# Patient Record
Sex: FEMALE
[De-identification: names, ages, dates, MRNs, and addresses within clinical notes are randomized per-mention and may not be internally consistent; named-entity substitution may affect disease eponyms.]

---

## 2020-09-28 ENCOUNTER — APPOINTMENT (OUTPATIENT)
Dept: UROLOGY | Facility: CLINIC | Age: 80
End: 2020-09-28
Payer: MEDICARE

## 2020-09-28 VITALS — HEART RATE: 77 BPM | SYSTOLIC BLOOD PRESSURE: 131 MMHG | DIASTOLIC BLOOD PRESSURE: 84 MMHG

## 2020-09-28 VITALS — TEMPERATURE: 96.9 F

## 2020-09-28 PROCEDURE — 99204 OFFICE O/P NEW MOD 45 MIN: CPT | Mod: 25

## 2020-09-28 PROCEDURE — 99214 OFFICE O/P EST MOD 30 MIN: CPT | Mod: 25

## 2020-09-28 PROCEDURE — 51798 US URINE CAPACITY MEASURE: CPT

## 2020-09-28 PROCEDURE — 99203 OFFICE O/P NEW LOW 30 MIN: CPT | Mod: 25

## 2020-09-28 NOTE — HISTORY OF PRESENT ILLNESS
[FreeTextEntry1] : Pt is an 80 year old female  who presents for complaints of "dark" vaginal discharge that began x 1 week ago. Pt states that she wears pads for urinary leakage throughout the day and last week she began to notice dark brown spotting in the pad which stopped last Friday.  She denies gross hematuria. Pt also reports nocturia and urgency incontinence that began 2-3 months ago.She wears 3 pads per day and a heavy pad at night. Pt has had UTIs in the past however she reports that  her symptoms now are not similar to that time. She states she has been experiencing left lower back pain and she recently underwent treatment with her spinal doctor and her symptoms improved after a local injection. She denies chills, fever, and gross hematuria, \par \par Not sexually active\par \par PMH: None\par SH: Colonoscopy 2019 normal\par Family Hx: Kidney stones, breast cancer mother \par Social hx: Former smoker, quit  (at peak: 1 pack per week), daily EtOH use

## 2020-09-28 NOTE — ASSESSMENT
[FreeTextEntry1] : Pt is an 80 year old female who presents for complaints of "dark" discharge (vagina vs urethra) that began x 1 week ago. Will send urine for culture and cytology today and patient will return for cystoscopy.   Arrangements made for patient to undergo a CT Urogram.

## 2020-09-28 NOTE — PHYSICAL EXAM
[General Appearance - Well Developed] : well developed [General Appearance - Well Nourished] : well nourished [Normal Appearance] : normal appearance [Well Groomed] : well groomed [General Appearance - In No Acute Distress] : no acute distress [] : no respiratory distress [Exaggerated Use Of Accessory Muscles For Inspiration] : no accessory muscle use [Costovertebral Angle Tenderness] : no ~M costovertebral angle tenderness [Normal Station and Gait] : the gait and station were normal for the patient's age [No Focal Deficits] : no focal deficits [Oriented To Time, Place, And Person] : oriented to person, place, and time [Affect] : the affect was normal [Mood] : the mood was normal [Not Anxious] : not anxious [Urinary Bladder Findings] : the bladder was normal on palpation [Vagina] : normal vaginal exam [Uterus] : uterus was normal size, without masses or tenderness [FreeTextEntry1] : no blood noted in vaginal vault, grade 1 cystocele

## 2020-09-30 LAB
BACTERIA UR CULT: NORMAL
URINE CYTOLOGY: NORMAL

## 2020-10-12 ENCOUNTER — APPOINTMENT (OUTPATIENT)
Dept: UROLOGY | Facility: CLINIC | Age: 80
End: 2020-10-12
Payer: MEDICARE

## 2020-10-12 ENCOUNTER — TRANSCRIPTION ENCOUNTER (OUTPATIENT)
Age: 80
End: 2020-10-12

## 2020-10-12 VITALS
SYSTOLIC BLOOD PRESSURE: 130 MMHG | BODY MASS INDEX: 38.4 KG/M2 | OXYGEN SATURATION: 95 % | HEIGHT: 62.5 IN | DIASTOLIC BLOOD PRESSURE: 78 MMHG | HEART RATE: 71 BPM | TEMPERATURE: 98.1 F | WEIGHT: 214 LBS

## 2020-10-12 DIAGNOSIS — Z00.00 ENCOUNTER FOR GENERAL ADULT MEDICAL EXAMINATION W/OUT ABNORMAL FINDINGS: ICD-10-CM

## 2020-10-12 LAB
BILIRUB UR QL STRIP: NORMAL
CLARITY UR: CLEAR
COLLECTION METHOD: NORMAL
GLUCOSE UR-MCNC: NORMAL
HCG UR QL: 1 EU/DL
HGB UR QL STRIP.AUTO: NORMAL
KETONES UR-MCNC: NORMAL
LEUKOCYTE ESTERASE UR QL STRIP: NORMAL
NITRITE UR QL STRIP: NORMAL
PH UR STRIP: 6
PROT UR STRIP-MCNC: NORMAL
SP GR UR STRIP: >=1.03

## 2020-10-12 PROCEDURE — 81003 URINALYSIS AUTO W/O SCOPE: CPT | Mod: QW

## 2020-10-12 PROCEDURE — 52000 CYSTOURETHROSCOPY: CPT

## 2020-10-12 PROCEDURE — 99214 OFFICE O/P EST MOD 30 MIN: CPT | Mod: 25

## 2020-10-13 NOTE — LETTER BODY
[Dear  ___] : Dear  [unfilled], [Consult Letter:] : I had the pleasure of evaluating your patient, [unfilled]. [Please see my note below.] : Please see my note below. [Consult Closing:] : Thank you very much for allowing me to participate in the care of this patient.  If you have any questions, please do not hesitate to contact me. [Sincerely,] : Sincerely, [FreeTextEntry3] : Dr. Erica Kan\par

## 2020-10-13 NOTE — ASSESSMENT
[FreeTextEntry1] : Pt is an 80 year old female who presents cystoscopy evaluation for complaints of "dark" discharge (vagina vs urethra) that began x 2 week ago. Cystoscopy today unremarkable. We reviewed the results of her recent CT scan which was negative from a genitourinary perspective but did reveal a right cystic adnexal mass.  She will follow up with her gynecologist regarding this.  We provided Ms. Chloe with a copy of the CT and faxed it to Dr. Tom Soto, her gynecologist.

## 2020-10-13 NOTE — HISTORY OF PRESENT ILLNESS
[FreeTextEntry1] : Pt is an 80 year old female  who initially presented for complaints of "dark" vaginal discharge that began x 2 week ago. Pt states that she wears pads for urinary leakage throughout the day and last week she began to notice dark brown spotting in the pad which stopped last Friday.  She denies gross hematuria. Pt also reports nocturia and urgency incontinence that began 2-3 months ago.She wears 3 pads per day and a heavy pad at night. Pt has had UTIs in the past however she reports that  her symptoms now are not similar to that time. She states she has been experiencing left lower back pain and she recently underwent treatment with her spinal doctor and her symptoms improved after a local injection. She denies chills, fever, and gross hematuria, She presents today for cystoscopy. \par \par \par 2020 Urine culture and cytology negative\par \par Not sexually active\par \par PMH: None\par SH: Colonoscopy  normal\par Family Hx: Kidney stones, breast cancer mother \par Social hx: Former smoker, quit  (at peak: 1 pack per week), daily EtOH use

## 2020-10-13 NOTE — PHYSICAL EXAM
[General Appearance - Well Developed] : well developed [General Appearance - Well Nourished] : well nourished [Normal Appearance] : normal appearance [Well Groomed] : well groomed [General Appearance - In No Acute Distress] : no acute distress [Costovertebral Angle Tenderness] : no ~M costovertebral angle tenderness [Urinary Bladder Findings] : the bladder was normal on palpation [Vagina] : normal vaginal exam [Uterus] : uterus was normal size, without masses or tenderness [] : no respiratory distress [Exaggerated Use Of Accessory Muscles For Inspiration] : no accessory muscle use [Oriented To Time, Place, And Person] : oriented to person, place, and time [Affect] : the affect was normal [Mood] : the mood was normal [Not Anxious] : not anxious [Normal Station and Gait] : the gait and station were normal for the patient's age [No Focal Deficits] : no focal deficits [FreeTextEntry1] : no blood noted in vaginal vault, grade 1 cystocele

## 2020-10-14 LAB — BACTERIA UR CULT: NORMAL

## 2024-11-29 ENCOUNTER — OUTPATIENT (OUTPATIENT)
Dept: OUTPATIENT SERVICES | Facility: HOSPITAL | Age: 84
LOS: 1 days | End: 2024-11-29
Payer: MEDICARE

## 2024-11-29 ENCOUNTER — APPOINTMENT (OUTPATIENT)
Dept: CT IMAGING | Facility: HOSPITAL | Age: 84
End: 2024-11-29

## 2024-11-29 PROCEDURE — 75574 CT ANGIO HRT W/3D IMAGE: CPT | Mod: MH

## 2024-11-29 PROCEDURE — 75574 CT ANGIO HRT W/3D IMAGE: CPT | Mod: 26,MH

## 2024-11-29 PROCEDURE — 82565 ASSAY OF CREATININE: CPT

## 2025-02-13 ENCOUNTER — INPATIENT (INPATIENT)
Facility: HOSPITAL | Age: 85
LOS: 2 days | Discharge: NURSING, REHAB OR LONG-TERM CARE | DRG: 948 | End: 2025-02-16
Attending: STUDENT IN AN ORGANIZED HEALTH CARE EDUCATION/TRAINING PROGRAM | Admitting: STUDENT IN AN ORGANIZED HEALTH CARE EDUCATION/TRAINING PROGRAM
Payer: MEDICARE

## 2025-02-13 VITALS
TEMPERATURE: 98 F | WEIGHT: 201.94 LBS | DIASTOLIC BLOOD PRESSURE: 76 MMHG | OXYGEN SATURATION: 97 % | SYSTOLIC BLOOD PRESSURE: 179 MMHG | HEART RATE: 85 BPM | RESPIRATION RATE: 18 BRPM | HEIGHT: 62 IN

## 2025-02-13 DIAGNOSIS — N94.89 OTHER SPECIFIED CONDITIONS ASSOCIATED WITH FEMALE GENITAL ORGANS AND MENSTRUAL CYCLE: ICD-10-CM

## 2025-02-13 DIAGNOSIS — S32.040A WEDGE COMPRESSION FRACTURE OF FOURTH LUMBAR VERTEBRA, INITIAL ENCOUNTER FOR CLOSED FRACTURE: ICD-10-CM

## 2025-02-13 DIAGNOSIS — Z29.9 ENCOUNTER FOR PROPHYLACTIC MEASURES, UNSPECIFIED: ICD-10-CM

## 2025-02-13 LAB
ALBUMIN SERPL ELPH-MCNC: 4.1 G/DL — SIGNIFICANT CHANGE UP (ref 3.3–5)
ALP SERPL-CCNC: 96 U/L — SIGNIFICANT CHANGE UP (ref 40–120)
ALT FLD-CCNC: 11 U/L — SIGNIFICANT CHANGE UP (ref 10–45)
ANION GAP SERPL CALC-SCNC: 13 MMOL/L — SIGNIFICANT CHANGE UP (ref 5–17)
AST SERPL-CCNC: 25 U/L — SIGNIFICANT CHANGE UP (ref 10–40)
BASOPHILS # BLD AUTO: 0.06 K/UL — SIGNIFICANT CHANGE UP (ref 0–0.2)
BASOPHILS NFR BLD AUTO: 0.5 % — SIGNIFICANT CHANGE UP (ref 0–2)
BILIRUB SERPL-MCNC: 0.8 MG/DL — SIGNIFICANT CHANGE UP (ref 0.2–1.2)
BUN SERPL-MCNC: 16 MG/DL — SIGNIFICANT CHANGE UP (ref 7–23)
CALCIUM SERPL-MCNC: 9.2 MG/DL — SIGNIFICANT CHANGE UP (ref 8.4–10.5)
CHLORIDE SERPL-SCNC: 101 MMOL/L — SIGNIFICANT CHANGE UP (ref 96–108)
CO2 SERPL-SCNC: 23 MMOL/L — SIGNIFICANT CHANGE UP (ref 22–31)
CREAT SERPL-MCNC: 0.52 MG/DL — SIGNIFICANT CHANGE UP (ref 0.5–1.3)
EGFR: 92 ML/MIN/1.73M2 — SIGNIFICANT CHANGE UP
EOSINOPHIL # BLD AUTO: 0.08 K/UL — SIGNIFICANT CHANGE UP (ref 0–0.5)
EOSINOPHIL NFR BLD AUTO: 0.6 % — SIGNIFICANT CHANGE UP (ref 0–6)
FLUAV AG NPH QL: SIGNIFICANT CHANGE UP
FLUBV AG NPH QL: SIGNIFICANT CHANGE UP
GLUCOSE SERPL-MCNC: 99 MG/DL — SIGNIFICANT CHANGE UP (ref 70–99)
HCT VFR BLD CALC: 46.1 % — HIGH (ref 34.5–45)
HGB BLD-MCNC: 15.3 G/DL — SIGNIFICANT CHANGE UP (ref 11.5–15.5)
IMM GRANULOCYTES NFR BLD AUTO: 0.4 % — SIGNIFICANT CHANGE UP (ref 0–0.9)
LYMPHOCYTES # BLD AUTO: 15.6 % — SIGNIFICANT CHANGE UP (ref 13–44)
LYMPHOCYTES # BLD AUTO: 2.04 K/UL — SIGNIFICANT CHANGE UP (ref 1–3.3)
MCHC RBC-ENTMCNC: 29.2 PG — SIGNIFICANT CHANGE UP (ref 27–34)
MCHC RBC-ENTMCNC: 33.2 G/DL — SIGNIFICANT CHANGE UP (ref 32–36)
MCV RBC AUTO: 88 FL — SIGNIFICANT CHANGE UP (ref 80–100)
MONOCYTES # BLD AUTO: 1.43 K/UL — HIGH (ref 0–0.9)
MONOCYTES NFR BLD AUTO: 10.9 % — SIGNIFICANT CHANGE UP (ref 2–14)
NEUTROPHILS # BLD AUTO: 9.44 K/UL — HIGH (ref 1.8–7.4)
NEUTROPHILS NFR BLD AUTO: 72 % — SIGNIFICANT CHANGE UP (ref 43–77)
NRBC BLD AUTO-RTO: 0 /100 WBCS — SIGNIFICANT CHANGE UP (ref 0–0)
PLATELET # BLD AUTO: 233 K/UL — SIGNIFICANT CHANGE UP (ref 150–400)
POTASSIUM SERPL-MCNC: 3.9 MMOL/L — SIGNIFICANT CHANGE UP (ref 3.5–5.3)
POTASSIUM SERPL-SCNC: 3.9 MMOL/L — SIGNIFICANT CHANGE UP (ref 3.5–5.3)
PROT SERPL-MCNC: 6.7 G/DL — SIGNIFICANT CHANGE UP (ref 6–8.3)
RBC # BLD: 5.24 M/UL — HIGH (ref 3.8–5.2)
RBC # FLD: 13.9 % — SIGNIFICANT CHANGE UP (ref 10.3–14.5)
RSV RNA NPH QL NAA+NON-PROBE: SIGNIFICANT CHANGE UP
SARS-COV-2 RNA SPEC QL NAA+PROBE: SIGNIFICANT CHANGE UP
SODIUM SERPL-SCNC: 137 MMOL/L — SIGNIFICANT CHANGE UP (ref 135–145)
WBC # BLD: 13.1 K/UL — HIGH (ref 3.8–10.5)
WBC # FLD AUTO: 13.1 K/UL — HIGH (ref 3.8–10.5)

## 2025-02-13 PROCEDURE — 93010 ELECTROCARDIOGRAM REPORT: CPT

## 2025-02-13 PROCEDURE — 74177 CT ABD & PELVIS W/CONTRAST: CPT | Mod: 26

## 2025-02-13 PROCEDURE — 99223 1ST HOSP IP/OBS HIGH 75: CPT | Mod: GC

## 2025-02-13 PROCEDURE — 71045 X-RAY EXAM CHEST 1 VIEW: CPT | Mod: 26

## 2025-02-13 PROCEDURE — 99285 EMERGENCY DEPT VISIT HI MDM: CPT

## 2025-02-13 RX ORDER — ACETAMINOPHEN 500 MG/5ML
650 LIQUID (ML) ORAL EVERY 6 HOURS
Refills: 0 | Status: DISCONTINUED | OUTPATIENT
Start: 2025-02-13 | End: 2025-02-16

## 2025-02-13 RX ORDER — ACETAMINOPHEN 500 MG/5ML
650 LIQUID (ML) ORAL EVERY 6 HOURS
Refills: 0 | Status: DISCONTINUED | OUTPATIENT
Start: 2025-02-13 | End: 2025-02-13

## 2025-02-13 RX ORDER — DULOXETINE 20 MG/1
30 CAPSULE, DELAYED RELEASE ORAL DAILY
Refills: 0 | Status: DISCONTINUED | OUTPATIENT
Start: 2025-02-13 | End: 2025-02-16

## 2025-02-13 RX ORDER — SENNA 187 MG
2 TABLET ORAL AT BEDTIME
Refills: 0 | Status: DISCONTINUED | OUTPATIENT
Start: 2025-02-13 | End: 2025-02-16

## 2025-02-13 RX ORDER — DULOXETINE 20 MG/1
1 CAPSULE, DELAYED RELEASE ORAL
Refills: 0 | DISCHARGE

## 2025-02-13 RX ORDER — METOPROLOL SUCCINATE 50 MG/1
1 TABLET, EXTENDED RELEASE ORAL
Refills: 0 | DISCHARGE

## 2025-02-13 RX ORDER — ROSUVASTATIN CALCIUM 20 MG/1
1 TABLET, FILM COATED ORAL
Refills: 0 | DISCHARGE

## 2025-02-13 RX ORDER — KETOROLAC TROMETHAMINE 30 MG/ML
30 INJECTION, SOLUTION INTRAMUSCULAR; INTRAVENOUS ONCE
Refills: 0 | Status: DISCONTINUED | OUTPATIENT
Start: 2025-02-13 | End: 2025-02-13

## 2025-02-13 RX ORDER — ENOXAPARIN SODIUM 100 MG/ML
40 INJECTION SUBCUTANEOUS EVERY 24 HOURS
Refills: 0 | Status: DISCONTINUED | OUTPATIENT
Start: 2025-02-13 | End: 2025-02-13

## 2025-02-13 RX ORDER — CYCLOBENZAPRINE HYDROCHLORIDE 15 MG/1
1 CAPSULE, EXTENDED RELEASE ORAL
Refills: 0 | DISCHARGE

## 2025-02-13 RX ORDER — IBUPROFEN 200 MG
400 TABLET ORAL EVERY 8 HOURS
Refills: 0 | Status: DISCONTINUED | OUTPATIENT
Start: 2025-02-13 | End: 2025-02-16

## 2025-02-13 RX ORDER — IBUPROFEN 200 MG
200 TABLET ORAL EVERY 8 HOURS
Refills: 0 | Status: DISCONTINUED | OUTPATIENT
Start: 2025-02-13 | End: 2025-02-13

## 2025-02-13 RX ORDER — ASPIRIN 325 MG
1 TABLET ORAL
Refills: 0 | DISCHARGE

## 2025-02-13 RX ORDER — METOPROLOL SUCCINATE 50 MG/1
25 TABLET, EXTENDED RELEASE ORAL EVERY 24 HOURS
Refills: 0 | Status: DISCONTINUED | OUTPATIENT
Start: 2025-02-14 | End: 2025-02-16

## 2025-02-13 RX ORDER — ROSUVASTATIN CALCIUM 20 MG/1
20 TABLET, FILM COATED ORAL AT BEDTIME
Refills: 0 | Status: DISCONTINUED | OUTPATIENT
Start: 2025-02-13 | End: 2025-02-16

## 2025-02-13 RX ORDER — GABAPENTIN 400 MG/1
1 CAPSULE ORAL
Refills: 0 | DISCHARGE

## 2025-02-13 RX ORDER — GABAPENTIN 400 MG/1
100 CAPSULE ORAL EVERY 8 HOURS
Refills: 0 | Status: DISCONTINUED | OUTPATIENT
Start: 2025-02-13 | End: 2025-02-16

## 2025-02-13 RX ORDER — KETOROLAC TROMETHAMINE 30 MG/ML
15 INJECTION, SOLUTION INTRAMUSCULAR; INTRAVENOUS EVERY 8 HOURS
Refills: 0 | Status: DISCONTINUED | OUTPATIENT
Start: 2025-02-13 | End: 2025-02-14

## 2025-02-13 RX ORDER — LIDOCAINE HYDROCHLORIDE 20 MG/ML
1 JELLY TOPICAL EVERY 24 HOURS
Refills: 0 | Status: DISCONTINUED | OUTPATIENT
Start: 2025-02-13 | End: 2025-02-16

## 2025-02-13 RX ORDER — METHYLPREDNISOLONE ACETATE 80 MG/ML
INJECTION, SUSPENSION INTRA-ARTICULAR; INTRALESIONAL; INTRAMUSCULAR; SOFT TISSUE
Refills: 0 | Status: DISCONTINUED | OUTPATIENT
Start: 2025-02-14 | End: 2025-02-16

## 2025-02-13 RX ORDER — OMEPRAZOLE 20 MG/1
1 CAPSULE, DELAYED RELEASE ORAL
Refills: 0 | DISCHARGE

## 2025-02-13 RX ORDER — CYCLOBENZAPRINE HYDROCHLORIDE 15 MG/1
10 CAPSULE, EXTENDED RELEASE ORAL ONCE
Refills: 0 | Status: COMPLETED | OUTPATIENT
Start: 2025-02-13 | End: 2025-02-13

## 2025-02-13 RX ORDER — CYCLOBENZAPRINE HYDROCHLORIDE 15 MG/1
5 CAPSULE, EXTENDED RELEASE ORAL EVERY 8 HOURS
Refills: 0 | Status: DISCONTINUED | OUTPATIENT
Start: 2025-02-13 | End: 2025-02-16

## 2025-02-13 RX ORDER — MELATONIN 5 MG
3 TABLET ORAL AT BEDTIME
Refills: 0 | Status: DISCONTINUED | OUTPATIENT
Start: 2025-02-13 | End: 2025-02-16

## 2025-02-13 RX ORDER — POLYETHYLENE GLYCOL 3350 17 G/17G
17 POWDER, FOR SOLUTION ORAL EVERY 24 HOURS
Refills: 0 | Status: DISCONTINUED | OUTPATIENT
Start: 2025-02-14 | End: 2025-02-16

## 2025-02-13 RX ORDER — ENOXAPARIN SODIUM 100 MG/ML
40 INJECTION SUBCUTANEOUS EVERY 12 HOURS
Refills: 0 | Status: DISCONTINUED | OUTPATIENT
Start: 2025-02-13 | End: 2025-02-16

## 2025-02-13 RX ADMIN — KETOROLAC TROMETHAMINE 30 MILLIGRAM(S): 30 INJECTION, SOLUTION INTRAMUSCULAR; INTRAVENOUS at 20:48

## 2025-02-13 RX ADMIN — Medication 1000 MILLILITER(S): at 18:11

## 2025-02-13 RX ADMIN — ENOXAPARIN SODIUM 40 MILLIGRAM(S): 100 INJECTION SUBCUTANEOUS at 22:11

## 2025-02-13 RX ADMIN — CYCLOBENZAPRINE HYDROCHLORIDE 10 MILLIGRAM(S): 15 CAPSULE, EXTENDED RELEASE ORAL at 18:12

## 2025-02-13 RX ADMIN — Medication 2 TABLET(S): at 22:11

## 2025-02-13 RX ADMIN — KETOROLAC TROMETHAMINE 30 MILLIGRAM(S): 30 INJECTION, SOLUTION INTRAMUSCULAR; INTRAVENOUS at 18:11

## 2025-02-13 RX ADMIN — Medication 650 MILLIGRAM(S): at 22:10

## 2025-02-13 NOTE — H&P ADULT - NSHPPHYSICALEXAM_GEN_ALL_CORE
.  VITAL SIGNS:  T(C): 37.4 (02-13-25 @ 18:31), Max: 37.4 (02-13-25 @ 18:31)  T(F): 99.4 (02-13-25 @ 18:31), Max: 99.4 (02-13-25 @ 18:31)  HR: 86 (02-13-25 @ 18:31) (85 - 86)  BP: 133/75 (02-13-25 @ 18:31) (133/75 - 179/76)  BP(mean): --  RR: 18 (02-13-25 @ 18:31) (18 - 18)  SpO2: 90% (02-13-25 @ 18:31) (90% - 97%)  Wt(kg): --    PHYSICAL EXAM:    Constitutional: resting comfortably in bed; NAD  Head: NC/AT  Eyes: PERRL, EOMI, anicteric sclera  ENT: no nasal discharge; uvula midline, no oropharyngeal erythema or exudates; MMM  Neck: supple; no JVD or thyromegaly  Respiratory: CTA B/L; no W/R/R, no retractions  Cardiac: +S1/S2; RRR; no M/R/G; PMI non-displaced  Gastrointestinal: abdomen soft, NT/ND; no rebound or guarding; +BSx4  Back: spine midline, no bony tenderness or step-offs; no CVAT B/L  Extremities: WWP, no clubbing or cyanosis; no peripheral edema  Musculoskeletal: NROM x4; no joint swelling, tenderness or erythema  Vascular: 2+ radial, DP/PT pulses B/L  Dermatologic: skin warm, dry and intact; no rashes, wounds, or scars  Lymphatic: no submandibular or cervical LAD  Neurologic: AAOx3; CNII-XII grossly intact; no focal deficits  Psychiatric: affect and characteristics of appearance, verbalizations, behaviors are appropriate .  VITAL SIGNS:  T(C): 37.4 (02-13-25 @ 18:31), Max: 37.4 (02-13-25 @ 18:31)  T(F): 99.4 (02-13-25 @ 18:31), Max: 99.4 (02-13-25 @ 18:31)  HR: 86 (02-13-25 @ 18:31) (85 - 86)  BP: 133/75 (02-13-25 @ 18:31) (133/75 - 179/76)  BP(mean): --  RR: 18 (02-13-25 @ 18:31) (18 - 18)  SpO2: 90% (02-13-25 @ 18:31) (90% - 97%)  Wt(kg): --    PHYSICAL EXAM:    Constitutional: resting comfortably in bed; NAD  Head: NC/AT  Eyes: PERRL, EOMI, anicteric sclera  ENT: no nasal discharge; MMM  Neck: supple; no JVD or thyromegaly  Respiratory: CTA B/L; no W/R/R, no retractions  Cardiac: +S1/S2; RRR; no M/R/G  Gastrointestinal: abdomen soft, NT/ND; no rebound or guarding; +BSx4  Back: unable to examine as pain exacerbated with movement  Extremities: WWP, no clubbing or cyanosis; no peripheral edema  Musculoskeletal: NROM x4; no joint swelling, tenderness or erythema  Vascular: 2+ radial, DP/PT pulses B/L  Dermatologic: skin warm, dry and intact; no rashes, wounds, or scars  Neurologic: AAOx3 but intermittently forgetful; CNII-XII grossly intact; no focal deficits  Psychiatric: affect and characteristics of appearance, verbalizations, behaviors are appropriate

## 2025-02-13 NOTE — ED PROVIDER NOTE - CLINICAL SUMMARY MEDICAL DECISION MAKING FREE TEXT BOX
Pt with multiple complaints post kyphoplasty yesterday - ongoing back pain, no focal weakness or deficits on exam.  no ev of cord compression or infection or hematoma.  Plan pain control and reassess.  LLQ pain hx diverticulitis.  WIll check labs, urine, CT and reassess.  Will given pain meds for pain control.

## 2025-02-13 NOTE — ED ADULT NURSE NOTE - OBJECTIVE STATEMENT
pt blancaa from home for eval severe lower back pain s/p procedure in pain management office yesterday pt reports taking tramadol without relief at home. back pain persistent since a fall in January but no new falls as of late. Respirations unlabored abd nondistended no fever chills numbness tingling loss of bowel or bladder continence. Iv placed labs sent

## 2025-02-13 NOTE — H&P ADULT - ATTENDING COMMENTS
85yo F with PMH of HTN, TIA (2017, no residual deficits), osteoarthritis of the spine who presents with intractible back pain x few weeks, worse since last night, admitted for uncontrolled back pain with difficulty ambulating.     Plan   -no red flag s/s; back pain now improved  CTM   -c/w pain control    -collateral in am from outpt Ortho   -c/w bowel regimen    -SAEs    -PT/SW

## 2025-02-13 NOTE — H&P ADULT - HISTORY OF PRESENT ILLNESS
HPI: Patient is an 85yo F with PMhx of L4 fracture s/p kyphoplasty 2/12 @ [facility] now with persistent pain in lumbar back, LLQ since yesterday. Hx diverticulitis and feels similar. Pain is worse with movement, minimal pain on rest. Difficulty managing pain on own, lives alone, daughter away, and taking ibuprofen, flexeril and gabapentin. No focal weakness to legs or other regions, no numbness, tingling or changes to bowel or bladder habits, though patient admits she urinated on self last night because she was in too much pain to go to bathroom.  No fever, chills, vomiting, cp or sob.    In the ED:  Initial Vital Signs: T 98.4 F, HR 85, /76, RR 18, SpO2 97% on room air  Labs: significant for WBC 13.10, Hgb 15.3  EKG: normal sinus rhythm, QTc 452  CXR: no consolidations noted  CT A/P: No bowel obstruction or inflammation. Moderate amount of stool throughout the colon. Scattered diverticulosis without evidence of acute diverticulitis. Postprocedural changes of recent L4 vertebroplasty. Incidental 5.6 cm right adnexal cyst. Recommend further characterization with outpatient pelvic ultrasound.   Interventions: NS 1L bolus, toradol 30mg IV x 1, flexeril 10mg PO x 1  Consults: none HPI: Patient is an 85yo F with PMH of HTN, TIA (2017, no residual deficits), osteoarthritis of the spine who presents with intractible back pain x few weeks, worse since last night. Patient reports symptoms initially started on 1/24/25 when she bent over to pick something up and proceeded to fall over the table, slipped while trying to get up and then hit her back and head. She had increasingly worse back pain and presented to [facility] ED 2 days after the fall with intractible back pain. Sates she was admitted for 2 days, seen by PT and cleared for discharge home. Since then, she saw an orthopedic physician at [facility] ([address]) who did an L4 kyphoplasty procedure on 2/12. Post-procedure, she was prescribed a Medrol dose pack, Ibuprofen 400mg tid prn, Cyclobenzaprine 5mg tid, Gabapentin 300mg (unclear frequency). and Tramadol 50-100mg q4-6h prn for severe pain. Since then, she has continued to have worsening pain despite taking Tramadol. Yesterday, the pain was so severe with movement that she could not get out of bed to use the bathroom i/s/o back pain, which led to an episode of urinating on herself. Currently, the pain is mostly concentrated on the L lower back, not tender at rest but worsens with movement. She does report some radiating pain down the back of both legs to the knees. Normally lives with her daughter and has HHA 4 hours per day since being discharged from [facility], but with her daughter currently traveling and not having much help at home, she is worried about taking care of herself i/s/o severe pain. Denies headache, chest pain, shortness of breath, abdominal pain currently. Did have an episode of LLQ pain a few days ago which she was concerned was i/s/o diverticulitis, but has now resolved. Does report constipation but denies any fevers/chills, blood in the stool. No focal weakness to legs, no numbness, tingling or changes to bowel or bladder habits. Does report some hx of dyspnea on exertion for which she had extensive cardiac and pulm testing which she reports was negative. Does report getting tested for sleep apnea which was "borderline", reports she snores at night and wakes up tired every day. No other complaints.     In the ED:  Initial Vital Signs: T 98.4 F, HR 85, /76, RR 18, SpO2 97% on room air  Labs: significant for WBC 13.10, Hgb 15.3  EKG: normal sinus rhythm, QTc 452  CXR: no consolidations noted  CT A/P: No bowel obstruction or inflammation. Moderate amount of stool throughout the colon. Scattered diverticulosis without evidence of acute diverticulitis. Postprocedural changes of recent L4 vertebroplasty. Incidental 5.6 cm right adnexal cyst. Recommend further characterization with outpatient pelvic ultrasound.   Interventions: NS 1L bolus, toradol 30mg IV x 1, flexeril 10mg PO x 1  Consults: none

## 2025-02-13 NOTE — H&P ADULT - NSHPSOCIALHISTORY_GEN_ALL_CORE
Drinks 1 glass of alcohol with dinner, but has not drank in the past week or so given the interactions with pain medications.   Former smoker, max 4 cigarettes a day, quit in 2008.  Denies illicit drug use.   Lives with daughter Patti. HHA 4h/day currently.  Ambulates without assistive devices at home, but uses a cane when leaving the apartment.

## 2025-02-13 NOTE — ED ADULT NURSE NOTE - BEFAST EYES
03/19/19 0800   C-SSRS (Frequent Screen)   2. Have you actually had any thoughts of killing yourself? No   6. Have you done anything, started to do anything, or prepared to do anything to end your life? No   Suicide Evaluation Negative screen= no ideation, behaviors or history   See treatment plan note.   No

## 2025-02-13 NOTE — H&P ADULT - PROBLEM SELECTOR PLAN 5
F: s/p 1L bolus  E replete as needed  N: regular  DVT ppx: lovenox  Dispo: F Home med: toprol 25mg qd  - continue home med

## 2025-02-13 NOTE — H&P ADULT - PROBLEM SELECTOR PLAN 1
Likely poorly controlled post-op pain i/s/o L4 fracture now s/p kyphoplasty on 2/12 with [facility]. No red flag symptoms on exam concerning for cord compression. Afebrile, minimal leukocytosis likely reactive i/s/o recent surgery, low concern for infectious etiology of pain at this time.   - pain control: tylenol 650 q6h standing, flexeril 5mg q8h prn for spasms, ibuprofen 200mg q8h prn for moderate pain, toradol IV 15mg q8h prn for severe pain  - PT consult  - neuro checks q8h  - fall precautions  - if pain remains uncontrolled or patient with worsening neuro exam, will likely need ortho spine eval Likely poorly controlled post-op pain i/s/o L4 fracture now s/p kyphoplasty on 2/12 with [facility]. No red flag symptoms on exam concerning for cord compression. Afebrile, minimal leukocytosis likely reactive i/s/o recent surgery, low concern for infectious etiology of pain at this time.   - pain control: lidocaine patch standing, tylenol 650 q6h standing, flexeril 5mg q8h prn for spasms, ibuprofen 200mg q8h prn for moderate pain, toradol IV 15mg q8h prn for severe pain  - PT consult  - neuro checks q8h  - fall precautions  - if pain remains uncontrolled or patient with worsening neuro exam, will likely need ortho spine eval Likely poorly controlled post-procedure pain i/s/o L4 fracture now s/p kyphoplasty on 2/12. No red flag symptoms on exam concerning for cord compression. Afebrile, minimal leukocytosis likely reactive i/s/o recent procedure, low concern for infectious etiology of pain at this time.   - pain control: lidocaine patch standing, tylenol 650 q6h standing, flexeril 5mg q8h prn for spasms, ibuprofen 400mg q8h prn for moderate pain, toradol IV 15mg q8h prn for severe pain  - start Medrol dose pack (was prescribed but has not started taking this post-procedure)  - PT consult  - neuro checks q8h  - fall precautions  - if pain remains uncontrolled or patient with worsening neuro exam, will likely need ortho spine eval

## 2025-02-13 NOTE — H&P ADULT - PROBLEM SELECTOR PLAN 3
Patient c/o LLQ pain that began yesterday. Has a hx of diverticulitis with similar symptoms in the past. No change in bowel habits. VSS. Exam ____. CT A/P with no evidence of bowel obstruction or inflammation, although does have diverticulosis with moderate amount of stool. Pain may be 2/2 constipation v. referred i/s/o back pain.   - f/u UA with reflex cx  - bowel regimen: miralax, senna  - monitor abdominal exam Patient c/o LLQ pain that began yesterday, now resolved. Has a hx of diverticulitis with similar symptoms in the past. No change in bowel habits. VSS. Exam benign. CT A/P with no evidence of bowel obstruction or inflammation, although does have diverticulosis with moderate amount of stool. Pain may be 2/2 constipation v. referred i/s/o back pain.   - f/u UA with reflex cx  - bowel regimen: miralax, senna  - monitor abdominal exam

## 2025-02-13 NOTE — ED PROVIDER NOTE - OBJECTIVE STATEMENT
85 yo F with hx of lumbar fracture s/p kyphoplasty yesterday now with persistent pain in lumbar back, LLQ since yesterday. Hx diverticulitis and feels similar.  Difficulty managing pain on own, lives alone, daughter away, and taking ibuprofen, flexeril and gabapentin.  No focal weakness to legs or other regions, no numbness, tingling or changes to bowel or bladder habits, though patient admits she urinated on self last night because she was in too much pain to go to bathroom.  No fever, chills, vomiting, cp or sob.

## 2025-02-13 NOTE — H&P ADULT - PROBLEM SELECTOR PLAN 4
Patient with incidental finding of 5.6cm R adnexal cyst on CT.  - outpatient pelvic ultrasound Two TIAs in ~2017 with ?vision changes/facial involvement, resolved spontaneously. Hx of stroke in father.   Home meds: ASA 81mg, crestor 20mg qhs  - continue home meds

## 2025-02-13 NOTE — H&P ADULT - ASSESSMENT
Patient is an 85yo F with PMhx of L4 fracture s/p kyphoplasty 2/12 @ [facility] now with persistent pain in lumbar back, LLQ since yesterday, admitted for intractible back pain and difficulty ambulating.  Patient is an 85yo F with PMH of HTN, TIA (2017, no residual deficits), osteoarthritis of the spine who presents with intractible back pain x few weeks, worse since last night, admitted for uncontrolled back pain with difficulty ambulating.

## 2025-02-13 NOTE — H&P ADULT - NSICDXPASTSURGICALHX_GEN_ALL_CORE_FT
PAST SURGICAL HISTORY:  S/P kyphoplasty     Status post left knee replacement     Status post right knee replacement

## 2025-02-13 NOTE — H&P ADULT - NSICDXFAMILYHX_GEN_ALL_CORE_FT
FAMILY HISTORY:  Father  Still living? Unknown  FH: stroke, Age at diagnosis: Age Unknown    Mother  Still living? Unknown  FH: breast cancer, Age at diagnosis: Age Unknown

## 2025-02-13 NOTE — ED ADULT TRIAGE NOTE - CHIEF COMPLAINT QUOTE
Pt BIBEMS c/o generalized weakness, pain to the L leg and frequent falls. Pt reports on 1/28 is when she started to feel very weak and fell 3 times onto her butt, no head strike.

## 2025-02-13 NOTE — ED PROVIDER NOTE - PROGRESS NOTE DETAILS
reassessed patient who reports while at rest pain is absent.  However any movements makes back pain worse and she cannot standing to walk.  Reassessed patients motor strength of LE and 5/5.  No saddle anesthesia present and no signs of cord compression.  Pt lives alone by self (daughter in [place]) and she only has HHA 2 hours a day, not at night.  Pt unable to transfer or use bathroom on own, unable to perform ADLs.  Will need inpatient management of pain and likely RYAN.

## 2025-02-13 NOTE — H&P ADULT - NSHPLABSRESULTS_GEN_ALL_CORE
15.3   13.10 )-----------( 233      ( 13 Feb 2025 16:36 )             46.1       02-13    137  |  101  |  16  ----------------------------<  99  3.9   |  23  |  0.52    Ca    9.2      13 Feb 2025 16:36    TPro  6.7  /  Alb  4.1  /  TBili  0.8  /  DBili  x   /  AST  25  /  ALT  11  /  AlkPhos  96  02-13              Urinalysis Basic - ( 13 Feb 2025 16:36 )    Color: x / Appearance: x / SG: x / pH: x  Gluc: 99 mg/dL / Ketone: x  / Bili: x / Urobili: x   Blood: x / Protein: x / Nitrite: x   Leuk Esterase: x / RBC: x / WBC x   Sq Epi: x / Non Sq Epi: x / Bacteria: x            Lactate Trend            CAPILLARY BLOOD GLUCOSE

## 2025-02-13 NOTE — ED PROVIDER NOTE - MUSCULOSKELETAL, MLM
Spine appears normal, range of motion is not limited, + dressing with clean wound present to lumbar spine, mild ttp around area, no redness or drainage or hematoma

## 2025-02-13 NOTE — PATIENT PROFILE ADULT - ..
[place]  Discharge- Samantha Wolfe 6/30/1932, 80 y o  male MRN: [id number]  Unit/Bed#: -01 Encounter: [id number]  Primary Care Provider: Gail Durán MD   Date and time admitted to hospital: 6/28/2022  8:23 PM    Hematuria  Assessment & Plan  · Newly noted 07/01 which resolves with manual irrigation   · Patient reportedly pulling on Jett   · Suspect 2/2 trauma   · Will hold DVT ppx   · Manually irrigate Jett catheter PRN hematuria   · Hgb currently stable 9 8   · Appears resolving  No aggressive interventions wished to be pursued by family  · Family still pending decision on hospice    Goals of care, counseling/discussion  Assessment & Plan  · S/p Goals of care discussion with palliative care- Pt family concerned about repetitive admissions to hospital, pt's advanced age, pneumonia in setting of rib fractures, ckd stage 4 and overall poor quality of life  · They are contemplating but have not agreed to hospice yet  · Seen by hospice liaison today who states patient is hospice appropriate   · 7/5 family decision for comfort care - spoke with son, no further management of hyponatremia etc focus on patients comfort of paramount importance to family    Sepsis ([facility] [address] )  Assessment & Plan  · Blood cultures - NGTD  · See PNA above  · Maintain MAP> 65  · Received 1 liter NS - no hypotension / lactic normal   · Leukocytosis resolved, afebrile, still with occasional sinus tachycardia      Multiple rib fractures  Assessment & Plan  · Admitted at [place] [id number] 6/12-6/21 with d/c to [facility] [place], [place]   · Left rib fractures 6-11  · Pulmonary toileting  · Lidocaine patch  · Tylenol for pain   Requiring narcotics for pain control    Hyponatremia  Assessment & Plan  · Sodium on admission 127 improved to 130 with IVF then decreased again to 127   · resolving  · 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   · Nephrology consult appreciated: switched home lasix 20mg every other day to torsemide 10mg daily  · Held torsemide 7/5 d/t rising Cr  · Family does not want any aggressive intervention   · Continue to monitor     Acute respiratory failure with hypoxia (HCC)  Assessment & Plan  · Requiring up to 4 L of supplemental oxygen- now down to 2L   · Likely secondary to pneumonia and underlying COPD   · Not on oxygen at home  · Continue to wean off oxygen as able    Centrilobular emphysema (HCC)  Assessment & Plan  · Resp  protocol  · Ipratropium TID  · levalbuterol TID      LIVIER (obstructive sleep apnea)  Assessment & Plan  · Patient non-compliant with CPAP  · Continue to monitor oxygenation       Chronic diastolic heart failure (HCC)  Assessment & Plan  Wt Readings from Last 3 Encounters:   07/05/22 66 5 kg (146 lb 9 6 oz)   05/20/22 74 8 kg (164 lb 12 8 oz)   05/08/22 74 8 kg (165 lb)       · Echo 9/28/21-- EF 55%  Grade 1 diastolic dysfunction  · PTA diuretic: lasix 20mg every other day   · Currently on torsemide 10mg daily   · Does not appear volume overloaded         Acquired hypothyroidism  Assessment & Plan  Continue home levothyroxine    GERD (gastroesophageal reflux disease)  Assessment & Plan  - Protonix 40 mg IV daily     Benign prostatic hyperplasia with lower urinary tract symptoms  Assessment & Plan  · Continue home Flomax  · Currently with harp catheter      Acute renal failure superimposed on stage 4 chronic kidney disease [facility])  Assessment & Plan  Lab Results   Component Value Date    EGFR 21 07/05/2022    EGFR 27 07/04/2022    EGFR 32 07/03/2022    CREATININE 2 54 (H) 07/05/2022    CREATININE 2 04 (H) 07/04/2022    CREATININE 1 78 (H) 07/03/2022   · Baseline creatinine between 1 7 - 1 8  · Insert harp cath for accurate I&Os  · Avoid nephrotoxins  Slightly above the baseline     · Monitor UO  · Torsemide held d/t rise in Cr to 2 5  · Continue monitor BMP      Essential hypertension  Assessment & Plan  · BP stable   · Continuous cardiac monitoring      * Hospital acquired PNA  Assessment & Plan  · Patient is an 80year-old patient with past medical history of diastolic HF, COPD, CAP, left rib fracture 6-11, recent discharge from [facility] to [facility] for rehab on 6/21  Patient presents to the hospital due to worsening fever, shortness of breath, cough, and increased oxygen requirement that started today, 6/29  Patient noted to be hypoxic upon EMS arrival    · CXR 6/29-- showed new subtle infiltrates  · BC x2: no growth at 72h  · Transitioned to doxycycline  · MRSA swab negative, V[facility] d/c   · Continue guaifenesin 1200 bid  · PRN Tessalon perles  · Ipratropium TID  · levalbuterol TID  · Leukocytosis resolved, patient afebrile appears stable on 2L  · Trend procalcitonin- downtrending 0 7 > 0 38               Hospital Course:     Timmy Kelly is a 80 y o  male patient who originally presented to the hospital on   Admission Orders (From admission, onward)     Ordered        06/28/22 2134  Inpatient Admission  Once                     due to pneumonia in the setting of recent rib fractures  Patients family initially was open to hearing about hospice, however patient was not signed onto hospice  Therefore he underwent medical management with antibiotics  His stay was complicated by GRETCHEN, hyponatremia, etc  Patients family on 7/6 spoke to me via telephone and elected comfort measures for their father  Patient was discharged to [facility] and he will be transitioned to hospice  Please see above list of diagnoses and related plan for additional information       Physical Exam:    GEN: No acute distress, comfortable, elderly male, on o2  HEEENT: No JVD, PERRLA, no scleral icterus  RESP: Lungs clear to auscultation bilaterally  CV: RRR, +s1/s2   ABD: SOFT NON TENDER, POSITIVE BOWEL SOUNDS, NO DISTENTION  PSYCH: CALM  NEURO: NO FOCAL DEFICITS  SKIN: NO RASH  EXTREM: NO EDEMA      Condition at Discharge:  serious      Discharge instructions/Information to patient and family:   See after visit summary for information provided to patient and family  Provisions for Follow-Up Care:  See after visit summary for information related to follow-up care and any pertinent home health orders  Disposition:     Facility = [facility] measures      Discharge Statement:  I spent 42 minutes discharging the patient  This time was spent on the day of discharge  I had direct contact with the patient on the day of discharge  Greater than 50% of the total time was spent examining patient, answering all patient questions, arranging and discussing plan of care with patient as well as directly providing post-discharge instructions  Additional time then spent on discharge activities  Discharge Medications:  See after visit summary for reconciled discharge medications provided to patient and family        ** Please Note: This note has been constructed using a voice recognition system ** 13-Feb-2025 23:50:12

## 2025-02-13 NOTE — PATIENT PROFILE ADULT - NSPROMEDSBROUGHTTOHOSP_GEN_A_NUR
Wound Care RN Visit     Patient seen in wound clinic for RN dressing change to buttocks wound. Wound stable without signs or symptoms of infection. Will continue current therapy plan. Wound care completed as ordered. Patient aware to call wound clinic with any questions or concerns.      Problem: Pressure Injury, Risk for  Goal: No new pressure injury (PI) development  Outcome: Outcome Met, Continue evaluating goal progress toward completion  Goal: # Verbalizes understanding of PI risk factors and prevention strategies  Description: Document education using the patient education activity.   Outcome: Outcome Met, Continue evaluating goal progress toward completion      no

## 2025-02-14 DIAGNOSIS — Z86.73 PERSONAL HISTORY OF TRANSIENT ISCHEMIC ATTACK (TIA), AND CEREBRAL INFARCTION WITHOUT RESIDUAL DEFICITS: ICD-10-CM

## 2025-02-14 DIAGNOSIS — I10 ESSENTIAL (PRIMARY) HYPERTENSION: ICD-10-CM

## 2025-02-14 DIAGNOSIS — Z98.890 OTHER SPECIFIED POSTPROCEDURAL STATES: Chronic | ICD-10-CM

## 2025-02-14 DIAGNOSIS — Z96.652 PRESENCE OF LEFT ARTIFICIAL KNEE JOINT: Chronic | ICD-10-CM

## 2025-02-14 DIAGNOSIS — R06.09 OTHER FORMS OF DYSPNEA: ICD-10-CM

## 2025-02-14 DIAGNOSIS — Z96.651 PRESENCE OF RIGHT ARTIFICIAL KNEE JOINT: Chronic | ICD-10-CM

## 2025-02-14 LAB
ANION GAP SERPL CALC-SCNC: 12 MMOL/L — SIGNIFICANT CHANGE UP (ref 5–17)
BUN SERPL-MCNC: 21 MG/DL — SIGNIFICANT CHANGE UP (ref 7–23)
CALCIUM SERPL-MCNC: 9.5 MG/DL — SIGNIFICANT CHANGE UP (ref 8.4–10.5)
CHLORIDE SERPL-SCNC: 100 MMOL/L — SIGNIFICANT CHANGE UP (ref 96–108)
CO2 SERPL-SCNC: 24 MMOL/L — SIGNIFICANT CHANGE UP (ref 22–31)
CREAT SERPL-MCNC: 0.51 MG/DL — SIGNIFICANT CHANGE UP (ref 0.5–1.3)
EGFR: 92 ML/MIN/1.73M2 — SIGNIFICANT CHANGE UP
GLUCOSE SERPL-MCNC: 135 MG/DL — HIGH (ref 70–99)
HCT VFR BLD CALC: 46.2 % — HIGH (ref 34.5–45)
HGB BLD-MCNC: 15.2 G/DL — SIGNIFICANT CHANGE UP (ref 11.5–15.5)
MAGNESIUM SERPL-MCNC: 2.2 MG/DL — SIGNIFICANT CHANGE UP (ref 1.6–2.6)
MCHC RBC-ENTMCNC: 29 PG — SIGNIFICANT CHANGE UP (ref 27–34)
MCHC RBC-ENTMCNC: 32.9 G/DL — SIGNIFICANT CHANGE UP (ref 32–36)
MCV RBC AUTO: 88.2 FL — SIGNIFICANT CHANGE UP (ref 80–100)
NRBC BLD AUTO-RTO: 0 /100 WBCS — SIGNIFICANT CHANGE UP (ref 0–0)
PHOSPHATE SERPL-MCNC: 3 MG/DL — SIGNIFICANT CHANGE UP (ref 2.5–4.5)
PLATELET # BLD AUTO: 235 K/UL — SIGNIFICANT CHANGE UP (ref 150–400)
POTASSIUM SERPL-MCNC: 4 MMOL/L — SIGNIFICANT CHANGE UP (ref 3.5–5.3)
POTASSIUM SERPL-SCNC: 4 MMOL/L — SIGNIFICANT CHANGE UP (ref 3.5–5.3)
RBC # BLD: 5.24 M/UL — HIGH (ref 3.8–5.2)
RBC # FLD: 13.8 % — SIGNIFICANT CHANGE UP (ref 10.3–14.5)
SODIUM SERPL-SCNC: 136 MMOL/L — SIGNIFICANT CHANGE UP (ref 135–145)
WBC # BLD: 9.04 K/UL — SIGNIFICANT CHANGE UP (ref 3.8–10.5)
WBC # FLD AUTO: 9.04 K/UL — SIGNIFICANT CHANGE UP (ref 3.8–10.5)

## 2025-02-14 PROCEDURE — 99233 SBSQ HOSP IP/OBS HIGH 50: CPT

## 2025-02-14 RX ORDER — METHYLPREDNISOLONE ACETATE 80 MG/ML
4 INJECTION, SUSPENSION INTRA-ARTICULAR; INTRALESIONAL; INTRAMUSCULAR; SOFT TISSUE AT BEDTIME
Refills: 0 | Status: DISCONTINUED | OUTPATIENT
Start: 2025-02-15 | End: 2025-02-16

## 2025-02-14 RX ORDER — POLYETHYLENE GLYCOL 3350 17 G/17G
17 POWDER, FOR SOLUTION ORAL DAILY
Refills: 0 | Status: DISCONTINUED | OUTPATIENT
Start: 2025-02-14 | End: 2025-02-14

## 2025-02-14 RX ORDER — METHYLPREDNISOLONE ACETATE 80 MG/ML
4 INJECTION, SUSPENSION INTRA-ARTICULAR; INTRALESIONAL; INTRAMUSCULAR; SOFT TISSUE
Refills: 0 | Status: DISCONTINUED | OUTPATIENT
Start: 2025-02-14 | End: 2025-02-16

## 2025-02-14 RX ORDER — ASPIRIN 325 MG
81 TABLET ORAL EVERY 24 HOURS
Refills: 0 | Status: DISCONTINUED | OUTPATIENT
Start: 2025-02-14 | End: 2025-02-16

## 2025-02-14 RX ORDER — SENNA 187 MG
2 TABLET ORAL AT BEDTIME
Refills: 0 | Status: DISCONTINUED | OUTPATIENT
Start: 2025-02-14 | End: 2025-02-14

## 2025-02-14 RX ORDER — METHYLPREDNISOLONE ACETATE 80 MG/ML
4 INJECTION, SUSPENSION INTRA-ARTICULAR; INTRALESIONAL; INTRAMUSCULAR; SOFT TISSUE
Refills: 0 | Status: COMPLETED | OUTPATIENT
Start: 2025-02-14 | End: 2025-02-16

## 2025-02-14 RX ORDER — METHYLPREDNISOLONE ACETATE 80 MG/ML
4 INJECTION, SUSPENSION INTRA-ARTICULAR; INTRALESIONAL; INTRAMUSCULAR; SOFT TISSUE
Refills: 0 | Status: COMPLETED | OUTPATIENT
Start: 2025-02-14 | End: 2025-02-15

## 2025-02-14 RX ORDER — METHYLPREDNISOLONE ACETATE 80 MG/ML
8 INJECTION, SUSPENSION INTRA-ARTICULAR; INTRALESIONAL; INTRAMUSCULAR; SOFT TISSUE AT BEDTIME
Refills: 0 | Status: COMPLETED | OUTPATIENT
Start: 2025-02-14 | End: 2025-02-14

## 2025-02-14 RX ORDER — METHYLPREDNISOLONE ACETATE 80 MG/ML
24 INJECTION, SUSPENSION INTRA-ARTICULAR; INTRALESIONAL; INTRAMUSCULAR; SOFT TISSUE ONCE
Refills: 0 | Status: COMPLETED | OUTPATIENT
Start: 2025-02-14 | End: 2025-02-14

## 2025-02-14 RX ADMIN — Medication 650 MILLIGRAM(S): at 12:31

## 2025-02-14 RX ADMIN — Medication 650 MILLIGRAM(S): at 19:14

## 2025-02-14 RX ADMIN — Medication 650 MILLIGRAM(S): at 18:14

## 2025-02-14 RX ADMIN — LIDOCAINE HYDROCHLORIDE 1 PATCH: 20 JELLY TOPICAL at 07:40

## 2025-02-14 RX ADMIN — LIDOCAINE HYDROCHLORIDE 1 PATCH: 20 JELLY TOPICAL at 22:57

## 2025-02-14 RX ADMIN — METHYLPREDNISOLONE ACETATE 4 MILLIGRAM(S): 80 INJECTION, SUSPENSION INTRA-ARTICULAR; INTRALESIONAL; INTRAMUSCULAR; SOFT TISSUE at 18:14

## 2025-02-14 RX ADMIN — LIDOCAINE HYDROCHLORIDE 1 PATCH: 20 JELLY TOPICAL at 01:00

## 2025-02-14 RX ADMIN — POLYETHYLENE GLYCOL 3350 17 GRAM(S): 17 POWDER, FOR SOLUTION ORAL at 05:28

## 2025-02-14 RX ADMIN — ENOXAPARIN SODIUM 40 MILLIGRAM(S): 100 INJECTION SUBCUTANEOUS at 09:58

## 2025-02-14 RX ADMIN — LIDOCAINE HYDROCHLORIDE 1 PATCH: 20 JELLY TOPICAL at 13:00

## 2025-02-14 RX ADMIN — Medication 650 MILLIGRAM(S): at 13:31

## 2025-02-14 RX ADMIN — METHYLPREDNISOLONE ACETATE 4 MILLIGRAM(S): 80 INJECTION, SUSPENSION INTRA-ARTICULAR; INTRALESIONAL; INTRAMUSCULAR; SOFT TISSUE at 14:16

## 2025-02-14 RX ADMIN — Medication 650 MILLIGRAM(S): at 05:28

## 2025-02-14 RX ADMIN — DULOXETINE 30 MILLIGRAM(S): 20 CAPSULE, DELAYED RELEASE ORAL at 12:31

## 2025-02-14 RX ADMIN — Medication 650 MILLIGRAM(S): at 22:56

## 2025-02-14 RX ADMIN — Medication 2 TABLET(S): at 22:52

## 2025-02-14 RX ADMIN — METHYLPREDNISOLONE ACETATE 4 MILLIGRAM(S): 80 INJECTION, SUSPENSION INTRA-ARTICULAR; INTRALESIONAL; INTRAMUSCULAR; SOFT TISSUE at 07:40

## 2025-02-14 RX ADMIN — ROSUVASTATIN CALCIUM 20 MILLIGRAM(S): 20 TABLET, FILM COATED ORAL at 22:52

## 2025-02-14 RX ADMIN — METOPROLOL SUCCINATE 25 MILLIGRAM(S): 50 TABLET, EXTENDED RELEASE ORAL at 09:59

## 2025-02-14 RX ADMIN — METHYLPREDNISOLONE ACETATE 24 MILLIGRAM(S): 80 INJECTION, SUSPENSION INTRA-ARTICULAR; INTRALESIONAL; INTRAMUSCULAR; SOFT TISSUE at 01:01

## 2025-02-14 RX ADMIN — Medication 40 MILLIGRAM(S): at 07:40

## 2025-02-14 RX ADMIN — Medication 81 MILLIGRAM(S): at 09:59

## 2025-02-14 RX ADMIN — METHYLPREDNISOLONE ACETATE 8 MILLIGRAM(S): 80 INJECTION, SUSPENSION INTRA-ARTICULAR; INTRALESIONAL; INTRAMUSCULAR; SOFT TISSUE at 22:54

## 2025-02-14 NOTE — PHYSICAL THERAPY INITIAL EVALUATION ADULT - DIAGNOSIS, PT EVAL
5A: Primary Prevention/Risk Reduction for Loss of Balance and Falling 4I: Impaired Joint Mobility, Motor Function, Muscle Performance, and Range of Motion Associated with Bony or Soft Tissue Surgery

## 2025-02-14 NOTE — CONSULT NOTE ADULT - ASSESSMENT
I M    84 y o F with PMH of HTN, TIA (2017, no residual deficits), osteoarthritis of the spine who presents with intractable back pain x few weeks, worse since last night, admitted for uncontrolled back pain with difficulty ambulating.     Problem/Plan - 1:  ·  Problem: Acute back pain.   ·  Plan: Likely poorly controlled post-procedure pain i/s/o L4 fracture now s/p kyphoplasty on 2/12. No red flag symptoms on exam concerning for cord compression. Afebrile, minimal leukocytosis likely reactive i/s/o recent procedure, low concern for infectious etiology of pain at this time.   - pain control: lidocaine patch standing, tylenol 650 q6h standing, flexeril 5mg q8h prn for spasms, ibuprofen 400mg q8h prn for moderate pain, toradol IV 15mg q8h prn for severe pain  - start Medrol dose pack (was prescribed but has not started taking this post-procedure)  - PT consult  - neuro checks q8h  - fall precautions  - if pain remains uncontrolled or patient with worsening neuro exam, will likely need ortho spine eval.    Problem/Plan - 2:  ·  Problem: Compression fracture of L4 vertebra.   ·  Plan: Recent fracture of L4, now s/p vertebroplasty at OSH on 2/12.   - pain control as above.    Problem/Plan - 3:  ·  Problem: Abdominal pain, acute.   ·  Plan: Patient c/o LLQ pain that began yesterday, now resolved. Has a hx of diverticulitis with similar symptoms in the past. No change in bowel habits. VSS. Exam benign. CT A/P with no evidence of bowel obstruction or inflammation, although does have diverticulosis with moderate amount of stool. Pain may be 2/2 constipation v. referred i/s/o back pain.   - f/u UA with reflex cx  - bowel regimen: miralax, senna  - monitor abdominal exam.    Problem/Plan - 4:  ·  Problem: History of TIAs.   ·  Plan: Two TIAs in ~2017 with ?vision changes/facial involvement, resolved spontaneously. Hx of stroke in father.   Home meds: ASA 81mg, crestor 20mg qhs  - continue home meds.    Problem/Plan - 5:  ·  Problem: HTN (hypertension).   ·  Plan: Home med: toprol 25mg qd  - continue home med.    Problem/Plan - 6:  ·  Problem: Adnexal mass.   ·  Plan: Patient with incidental finding of 5.6cm R adnexal cyst on CT.  - outpatient pelvic ultrasound.    Problem/Plan - 7:  ·  Problem: Prophylactic measure.   ·  Plan: F: s/p 1L bolus  E replete as needed  N: regular  DVT ppx: lovenox  Dispo: [facility].

## 2025-02-14 NOTE — PROGRESS NOTE ADULT - PROBLEM SELECTOR PLAN 1
Likely poorly controlled post-procedure pain i/s/o L4 fracture now s/p kyphoplasty on 2/12. No red flag symptoms on exam concerning for cord compression. Afebrile, minimal leukocytosis likely reactive i/s/o recent procedure, low concern for infectious etiology of pain at this time.   - pain control: lidocaine patch standing, tylenol 650 q6h standing, flexeril 5mg q8h prn for spasms, ibuprofen 400mg q8h prn for moderate pain, toradol IV 15mg q8h prn for severe pain  - start Medrol dose pack (was prescribed but has not started taking this post-procedure)  - PT consult  - neuro checks q8h  - fall precautions  - if pain remains uncontrolled or patient with worsening neuro exam, will likely need ortho spine eval Likely poorly controlled post-procedure pain i/s/o L4 fracture now s/p kyphoplasty on 2/12 (with Dr. Peng Chavez MD, Pain Management physician, Phone: [phone].)    No red flag symptoms on exam concerning for cord compression. Afebrile, minimal leukocytosis likely reactive i/s/o recent procedure, low concern for infectious etiology of pain at this time.     CT Pelvis and Lumbar spine 1/26 from [facility]  Suspected acute to subacute fracture involving the L4 superior endplate with mild height loss. No significant osseous retropulsion.  No definite acute fracture seen elsewhere. Lucency identified in the right greater trochanter on the same-day radiograph appears to be have been artifical. Multilevel degenerative change with significant canal and foraminal stenosis as detailed above, partially characterized.     - Medrol dose pack (was prescribed but has not started taking this post-procedure)  -Tylenol 650 mg q6   -ibuprofen 400 MG Q8HRS PRN   -Flexeril 5mg q8hrs PRN   - f/u PT consult  - fall and SPINE precautions  - if pain remains uncontrolled or patient with worsening neuro exam, will require further imaging

## 2025-02-14 NOTE — PHYSICAL THERAPY INITIAL EVALUATION ADULT - ADDITIONAL COMMENTS
Pt reports to live with daughter and has A services 2-6pm daily. Pt reports to have assist with ADLs and IADLs prior to admission when needed from [facility] and was receiving HPT and HOT after being d/c from [place] prior. Pt reports to live with daughter and has A services 2-6pm daily. Pt reports to have assist with ADLs and IADLs prior to admission when needed from [facility] and was receiving HPT and HOT after being d/c from [place] prior. Pt reports to use SC for ambulation but has since found it more difficult.

## 2025-02-14 NOTE — PROGRESS NOTE ADULT - PROBLEM SELECTOR PLAN 2
Recent fracture of L4, now s/p vertebroplasty at OSH on 2/12.   - pain control as above Recent fracture of L4, now s/p vertebroplasty at private practise on 2/12.     CT Pelvis and Lumbar spine 1/26 from [facility]  Suspected acute to subacute fracture involving the L4 superior endplate with mild height loss. No significant osseous retropulsion.  No definite acute fracture seen elsewhere. Lucency identified in the right greater trochanter on the same-day radiograph appears to be have been artifical. Multilevel degenerative change with significant canal and foraminal stenosis as detailed above, partially characterized.   - pain control as above

## 2025-02-14 NOTE — PHYSICAL THERAPY INITIAL EVALUATION ADULT - PERTINENT HX OF CURRENT PROBLEM, REHAB EVAL
Patient is an 85yo F with PMH of HTN, TIA (2017, no residual deficits), osteoarthritis of the spine who presents with intractible back pain x few weeks, worse since last night. Patient reports symptoms initially started on 1/24/25 when she bent over to pick something up and proceeded to fall over the table, slipped while trying to get up and then hit her back and head. She had increasingly worse back pain and presented to [place] ED 2 days after the fall with intractible back pain. Sates she was admitted for 2 days, seen by PT and cleared for discharge home. Since then, she saw an orthopedic physician at [facility] ([address]) who did an L4 kyphoplasty procedure on 2/12. Post-procedure, she was prescribed a Medrol dose pack, Ibuprofen 400mg tid prn, Cyclobenzaprine 5mg tid, Gabapentin 300mg (unclear frequency). and Tramadol 50-100mg q4-6h prn for severe pain. Since then, she has continued to have worsening pain despite taking Tramadol. Yesterday, the pain was so severe with movement that she could not get out of bed to use the bathroom i/s/o back pain, which led to an episode of urinating on herself. Currently, the pain is mostly concentrated on the L lower back, not tender at rest but worsens with movement. She does report some radiating pain down the back of both legs to the knees. Normally lives with her daughter and has HHA 4 hours per day since being discharged from [facility], but with her daughter currently traveling and not having much help at home, she is worried about taking care of herself i/s/o severe pain. Denies headache, chest pain, shortness of breath, abdominal pain currently. Did have an episode of LLQ pain a few days ago which she was concerned was i/s/o diverticulitis, but has now resolved. Does report constipation but denies any fevers/chills, blood in the stool. No focal weakness to legs, no numbness, tingling or changes to bowel or bladder habits. Does report some hx of dyspnea on exertion for which she had extensive cardiac and pulm testing which she reports was negative. Does report getting tested for sleep apnea which was "borderline", reports she snores at night and wakes up tired every day. No other complaints.

## 2025-02-14 NOTE — PROGRESS NOTE ADULT - PROBLEM SELECTOR PLAN 3
Patient c/o LLQ pain that began yesterday, now resolved. Has a hx of diverticulitis with similar symptoms in the past. No change in bowel habits. VSS. Exam benign. CT A/P with no evidence of bowel obstruction or inflammation, although does have diverticulosis with moderate amount of stool. Pain may be 2/2 constipation v. referred i/s/o back pain.   - f/u UA with reflex cx  - bowel regimen: miralax, senna  - monitor abdominal exam Patient c/o LLQ pain that began yesterday, now resolved. Has a hx of diverticulitis with similar symptoms in the past. No change in bowel habits. VSS. Exam benign. CT A/P with no evidence of bowel obstruction or inflammation, although does have diverticulosis with moderate amount of stool. Pain may be 2/2 constipation v. referred i/s/o back pain.     2/14 Last BM 3 days ago    - f/u UA with reflex cx  - bowel regimen: miralax, senna  - monitor abdominal exam

## 2025-02-14 NOTE — CONSULT NOTE ADULT - SUBJECTIVE AND OBJECTIVE BOX
Patient is a 84y old  Female who presents with a chief complaint of intractable back pain (13 Feb 2025 21:07)      HPI:  HPI: Patient is an 83yo F with PMH of HTN, TIA (2017, no residual deficits), osteoarthritis of the spine who presents with intractible back pain x few weeks, worse since last night. Patient reports symptoms initially started on 1/24/25 when she bent over to pick something up and proceeded to fall over the table, slipped while trying to get up and then hit her back and head. She had increasingly worse back pain and presented to [facility] ED 2 days after the fall with intractible back pain. Sates she was admitted for 2 days, seen by PT and cleared for discharge home. Since then, she saw an orthopedic physician at [facility] ([address]) who did an L4 kyphoplasty procedure on 2/12. Post-procedure, she was prescribed a Medrol dose pack, Ibuprofen 400mg tid prn, Cyclobenzaprine 5mg tid, Gabapentin 300mg (unclear frequency). and Tramadol 50-100mg q4-6h prn for severe pain. Since then, she has continued to have worsening pain despite taking Tramadol. Yesterday, the pain was so severe with movement that she could not get out of bed to use the bathroom i/s/o back pain, which led to an episode of urinating on herself. Currently, the pain is mostly concentrated on the L lower back, not tender at rest but worsens with movement. She does report some radiating pain down the back of both legs to the knees. Normally lives with her daughter and has HHA 4 hours per day since being discharged from [facility], but with her daughter currently traveling and not having much help at home, she is worried about taking care of herself i/s/o severe pain. Denies headache, chest pain, shortness of breath, abdominal pain currently. Did have an episode of LLQ pain a few days ago which she was concerned was i/s/o diverticulitis, but has now resolved. Does report constipation but denies any fevers/chills, blood in the stool. No focal weakness to legs, no numbness, tingling or changes to bowel or bladder habits. Does report some hx of dyspnea on exertion for which she had extensive cardiac and pulm testing which she reports was negative. Does report getting tested for sleep apnea which was "borderline", reports she snores at night and wakes up tired every day. No other complaints.     In the ED:  Initial Vital Signs: T 98.4 F, HR 85, /76, RR 18, SpO2 97% on room air  Labs: significant for WBC 13.10, Hgb 15.3  EKG: normal sinus rhythm, QTc 452  CXR: no consolidations noted  CT A/P: No bowel obstruction or inflammation. Moderate amount of stool throughout the colon. Scattered diverticulosis without evidence of acute diverticulitis. Postprocedural changes of recent L4 vertebroplasty. Incidental 5.6 cm right adnexal cyst. Recommend further characterization with outpatient pelvic ultrasound.   Interventions: NS 1L bolus, toradol 30mg IV x 1, flexeril 10mg PO x 1  Consults: none (13 Feb 2025 21:07)    PAST MEDICAL & SURGICAL HISTORY:  Osteoarthritis      TIA (transient ischemic attack)      Status post right knee replacement      Status post left knee replacement      S/P kyphoplasty        MEDICATIONS  (STANDING):  acetaminophen     Tablet .. 650 milliGRAM(s) Oral every 6 hours  aspirin enteric coated 81 milliGRAM(s) Oral every 24 hours  DULoxetine 30 milliGRAM(s) Oral daily  enoxaparin Injectable 40 milliGRAM(s) SubCutaneous every 12 hours  lidocaine   4% Patch 1 Patch Transdermal every 24 hours  methylPREDNISolone   Oral   methylPREDNISolone 4 milliGRAM(s) Oral before breakfast  methylPREDNISolone 4 milliGRAM(s) Oral after lunch  methylPREDNISolone 4 milliGRAM(s) Oral after dinner  methylPREDNISolone 8 milliGRAM(s) Oral at bedtime  metoprolol succinate ER 25 milliGRAM(s) Oral every 24 hours  pantoprazole    Tablet 40 milliGRAM(s) Oral before breakfast  polyethylene glycol 3350 17 Gram(s) Oral every 24 hours  rosuvastatin 20 milliGRAM(s) Oral at bedtime  senna 2 Tablet(s) Oral at bedtime    MEDICATIONS  (PRN):  cyclobenzaprine 5 milliGRAM(s) Oral every 8 hours PRN Muscle Spasm  gabapentin 100 milliGRAM(s) Oral every 8 hours PRN Neuropathic Pain  ibuprofen  Tablet. 400 milliGRAM(s) Oral every 8 hours PRN Moderate Pain (4 - 6)  ketorolac   Injectable 15 milliGRAM(s) IV Push every 8 hours PRN Severe Pain (7 - 10)  melatonin 3 milliGRAM(s) Oral at bedtime PRN Insomnia          FAMILY HISTORY:  FH: breast cancer (Mother)    FH: stroke (Father)        CBC Full  -  ( 13 Feb 2025 16:36 )  WBC Count : 13.10 K/uL  RBC Count : 5.24 M/uL  Hemoglobin : 15.3 g/dL  Hematocrit : 46.1 %  Platelet Count - Automated : 233 K/uL  Mean Cell Volume : 88.0 fl  Mean Cell Hemoglobin : 29.2 pg  Mean Cell Hemoglobin Concentration : 33.2 g/dL  Auto Neutrophil # : x  Auto Lymphocyte # : x  Auto Monocyte # : x  Auto Eosinophil # : x  Auto Basophil # : x  Auto Neutrophil % : x  Auto Lymphocyte % : x  Auto Monocyte % : x  Auto Eosinophil % : x  Auto Basophil % : x      02-13    137  |  101  |  16  ----------------------------<  99  3.9   |  23  |  0.52    Ca    9.2      13 Feb 2025 16:36    TPro  6.7  /  Alb  4.1  /  TBili  0.8  /  DBili  x   /  AST  25  /  ALT  11  /  AlkPhos  96  02-13      Urinalysis Basic - ( 13 Feb 2025 16:36 )    Color: x / Appearance: x / SG: x / pH: x  Gluc: 99 mg/dL / Ketone: x  / Bili: x / Urobili: x   Blood: x / Protein: x / Nitrite: x   Leuk Esterase: x / RBC: x / WBC x   Sq Epi: x / Non Sq Epi: x / Bacteria: x        Radiology :     < from: CT Abdomen and Pelvis w/ IV Cont (02.13.25 @ 17:50) >    ACC: [id number] EXAM:  CT ABDOMEN AND PELVIS IC   ORDERED BY: SIRISHA HILARIO     PROCEDURE DATE:  02/13/2025          INTERPRETATION:  CLINICAL INFORMATION: Left lower quadrant pain. History   of lumbar kyphoplasty yesterday.    COMPARISON: None.    CONTRAST/COMPLICATIONS:  IV Contrast: Isovue 370  90 cc administered   10 cc discarded  Oral Contrast: NONE  .    PROCEDURE:  CT of the Abdomen and Pelvis was performed.  Sagittal and coronal reformats were performed.    FINDINGS:  LOWER CHEST: Coronary artery calcifications.    LIVER: A few scattered cysts.  BILE DUCTS: Normal caliber.  GALLBLADDER: Cholelithiasis.  SPLEEN: Within normal limits.  PANCREAS: Within normal limits.  ADRENALS: Within normal limits.  KIDNEYS/URETERS: No hydronephrosis or obstructive renal calculi. Small   right renal cyst. Additional subcentimeter hypodensities are too small to   characterize.    BLADDER: Within normal limits.  REPRODUCTIVE ORGANS: Uterus and left adnexa are within normal limits. 5.6   x 3.7 cm right adnexal cyst (3-108).    BOWEL: No bowel obstruction. Appendix is normal. Moderate amount of stool   throughout the colon. Scattered diverticulosis without evidence of acute   diverticulitis.  PERITONEUM/RETROPERITONEUM: Within normal limits.  VESSELS: Within normal limits.  LYMPH NODES: No lymphadenopathy.  ABDOMINAL WALL: Within normal limits.  BONES: Degenerative changes. Status post L4 vertebroplasty.    IMPRESSION:  No bowel obstruction or inflammation. Moderate amount of stool throughout   the colon.    Postprocedural changes of recent L4 vertebroplasty.    Incidental 5.6 cm right adnexal cyst. Recommend further characterization   with outpatient pelvic ultrasound.            Review of Systems : per HPI         Vital Signs Last 24 Hrs  T(C): 36.6 (14 Feb 2025 06:36), Max: 37.4 (13 Feb 2025 18:31)  T(F): 97.9 (14 Feb 2025 06:36), Max: 99.4 (13 Feb 2025 18:31)  HR: 74 (14 Feb 2025 06:36) (74 - 86)  BP: 108/63 (14 Feb 2025 06:36) (108/63 - 179/76)  BP(mean): --  RR: 18 (14 Feb 2025 06:36) (17 - 18)  SpO2: 93% (14 Feb 2025 06:36) (90% - 97%)    Parameters below as of 14 Feb 2025 06:36  Patient On (Oxygen Delivery Method): nasal cannula  O2 Flow (L/min): 2          Physical Exam:  84 y o woman lying comfortably in semi Horner's position , awake , alert , no acute complaints     Head: normocephalic , atraumatic    Eyes: PERRLA , EOMI , no nystagmus , sclera anicteric    ENT / FACE: neg nasal discharge , uvula midline , no oropharyngeal erythema / exudate    Neck: supple , negative JVD , negative carotid bruits , no thyromegaly    Chest: CTA bilaterally     Cardiovascular: regular rate and rhythm , neg murmurs / rubs / gallops    Abdomen: soft , non distended , no tenderness to palpation in all 4 quadrants ,  normal bowel sounds     Extremities: WWP , neg cyanosis /clubbing / edema     Musculoskeletal:  L2-4 ttp, neg SLR      Neurologic Exam:     Alert and oriented to person , place , date/year     Motor Exam:        > 3+/5 x 4 extremities     Sensation:         intact to light touch x 4 extremities                                DTR:           biceps/brachioradialis: equal                            patella/ankle: equal          neg clonus     Gait:  not tested         PM&R Impression: admitted for intractable back pain x 4 weeks    - L4 fracture now s/p kyphoplasty on 2/12/2025    - no focal neurologic deficits       Recommendations / Plan:       1) Physical / Occupational therapy focusing on therapeutic exercises , equipment evaluation , bed mobility/transfer out of bed evaluation , progressive ambulation with assistive devices prn .    2) Current disposition plan recommendation:    pending functional progress

## 2025-02-14 NOTE — PROGRESS NOTE ADULT - SUBJECTIVE AND OBJECTIVE BOX
CC: Patient is a 84y old  Female who presents with a chief complaint of intractable back pain (14 Feb 2025 08:07)      INTERVAL EVENTS: CANDY    SUBJECTIVE / INTERVAL HPI: Patient seen and examined at bedside.     ROS: negative unless otherwise stated above.    VITAL SIGNS:  Vital Signs Last 24 Hrs  T(C): 36.6 (14 Feb 2025 06:36), Max: 37.4 (13 Feb 2025 18:31)  T(F): 97.9 (14 Feb 2025 06:36), Max: 99.4 (13 Feb 2025 18:31)  HR: 74 (14 Feb 2025 06:36) (74 - 86)  BP: 108/63 (14 Feb 2025 06:36) (108/63 - 179/76)  BP(mean): --  RR: 18 (14 Feb 2025 06:36) (17 - 18)  SpO2: 93% (14 Feb 2025 06:36) (90% - 97%)    Parameters below as of 14 Feb 2025 06:36  Patient On (Oxygen Delivery Method): nasal cannula  O2 Flow (L/min): 2          PHYSICAL EXAM:    General: NAD  HEENT: MMM  Neck: supple  Cardiovascular: +S1/S2; RRR  Respiratory: CTA B/L; no W/R/R  Gastrointestinal: soft, NT/ND  Extremities: WWP; no edema, clubbing or cyanosis  Vascular: 2+ radial, DP/PT pulses B/L  Neurological: AAOx3; no focal deficits    MEDICATIONS:  MEDICATIONS  (STANDING):  acetaminophen     Tablet .. 650 milliGRAM(s) Oral every 6 hours  aspirin enteric coated 81 milliGRAM(s) Oral every 24 hours  DULoxetine 30 milliGRAM(s) Oral daily  enoxaparin Injectable 40 milliGRAM(s) SubCutaneous every 12 hours  lidocaine   4% Patch 1 Patch Transdermal every 24 hours  methylPREDNISolone   Oral   methylPREDNISolone 4 milliGRAM(s) Oral before breakfast  methylPREDNISolone 4 milliGRAM(s) Oral after lunch  methylPREDNISolone 4 milliGRAM(s) Oral after dinner  methylPREDNISolone 8 milliGRAM(s) Oral at bedtime  metoprolol succinate ER 25 milliGRAM(s) Oral every 24 hours  pantoprazole    Tablet 40 milliGRAM(s) Oral before breakfast  polyethylene glycol 3350 17 Gram(s) Oral every 24 hours  rosuvastatin 20 milliGRAM(s) Oral at bedtime  senna 2 Tablet(s) Oral at bedtime    MEDICATIONS  (PRN):  cyclobenzaprine 5 milliGRAM(s) Oral every 8 hours PRN Muscle Spasm  gabapentin 100 milliGRAM(s) Oral every 8 hours PRN Neuropathic Pain  ibuprofen  Tablet. 400 milliGRAM(s) Oral every 8 hours PRN Moderate Pain (4 - 6)  ketorolac   Injectable 15 milliGRAM(s) IV Push every 8 hours PRN Severe Pain (7 - 10)  melatonin 3 milliGRAM(s) Oral at bedtime PRN Insomnia      ALLERGIES:  Allergies    oxycodone (Other)    Intolerances        LABS:                        15.2   9.04  )-----------( 235      ( 14 Feb 2025 08:34 )             46.2     02-13    137  |  101  |  16  ----------------------------<  99  3.9   |  23  |  0.52    Ca    9.2      13 Feb 2025 16:36    TPro  6.7  /  Alb  4.1  /  TBili  0.8  /  DBili  x   /  AST  25  /  ALT  11  /  AlkPhos  96  02-13      Urinalysis Basic - ( 13 Feb 2025 16:36 )    Color: x / Appearance: x / SG: x / pH: x  Gluc: 99 mg/dL / Ketone: x  / Bili: x / Urobili: x   Blood: x / Protein: x / Nitrite: x   Leuk Esterase: x / RBC: x / WBC x   Sq Epi: x / Non Sq Epi: x / Bacteria: x      CAPILLARY BLOOD GLUCOSE          RADIOLOGY & ADDITIONAL TESTS: Reviewed. CC: Patient is a 84y old  Female who presents with a chief complaint of intractable back pain (14 Feb 2025 08:07)      INTERVAL EVENTS: CANDY    SUBJECTIVE / INTERVAL HPI: Patient seen and examined at bedside. Patient feels fine. Not in back pain ashok while in bed. Reports the pain is worst when standing, and attempting to stand up/get out of bed. Has a chronic dry cough at night. Had a BM 3 days ago, urinating without issues, and has decreased appetite.     ROS: negative unless otherwise stated above.    VITAL SIGNS:  Vital Signs Last 24 Hrs  T(C): 36.6 (14 Feb 2025 06:36), Max: 37.4 (13 Feb 2025 18:31)  T(F): 97.9 (14 Feb 2025 06:36), Max: 99.4 (13 Feb 2025 18:31)  HR: 74 (14 Feb 2025 06:36) (74 - 86)  BP: 108/63 (14 Feb 2025 06:36) (108/63 - 179/76)  BP(mean): --  RR: 18 (14 Feb 2025 06:36) (17 - 18)  SpO2: 93% (14 Feb 2025 06:36) (90% - 97%)    Parameters below as of 14 Feb 2025 06:36  Patient On (Oxygen Delivery Method): nasal cannula  O2 Flow (L/min): 2          PHYSICAL EXAM:    General: NAD  HEENT: MMM  Neck: supple  Cardiovascular: +S1/S2; RRR  Respiratory: CTA B/L; no W/R/R  Gastrointestinal: soft, NT/ND  Extremities: WWP; trace pit edema, no clubbing or cyanosis  Trunk/spine: TTP to the left side of the lumbar back   Vascular: 2+ radial, DP/PT pulses B/L  Neurological: AAOx3; no focal deficits    MEDICATIONS:  MEDICATIONS  (STANDING):  acetaminophen     Tablet .. 650 milliGRAM(s) Oral every 6 hours  aspirin enteric coated 81 milliGRAM(s) Oral every 24 hours  DULoxetine 30 milliGRAM(s) Oral daily  enoxaparin Injectable 40 milliGRAM(s) SubCutaneous every 12 hours  lidocaine   4% Patch 1 Patch Transdermal every 24 hours  methylPREDNISolone   Oral   methylPREDNISolone 4 milliGRAM(s) Oral before breakfast  methylPREDNISolone 4 milliGRAM(s) Oral after lunch  methylPREDNISolone 4 milliGRAM(s) Oral after dinner  methylPREDNISolone 8 milliGRAM(s) Oral at bedtime  metoprolol succinate ER 25 milliGRAM(s) Oral every 24 hours  pantoprazole    Tablet 40 milliGRAM(s) Oral before breakfast  polyethylene glycol 3350 17 Gram(s) Oral every 24 hours  rosuvastatin 20 milliGRAM(s) Oral at bedtime  senna 2 Tablet(s) Oral at bedtime    MEDICATIONS  (PRN):  cyclobenzaprine 5 milliGRAM(s) Oral every 8 hours PRN Muscle Spasm  gabapentin 100 milliGRAM(s) Oral every 8 hours PRN Neuropathic Pain  ibuprofen  Tablet. 400 milliGRAM(s) Oral every 8 hours PRN Moderate Pain (4 - 6)  ketorolac   Injectable 15 milliGRAM(s) IV Push every 8 hours PRN Severe Pain (7 - 10)  melatonin 3 milliGRAM(s) Oral at bedtime PRN Insomnia      ALLERGIES:  Allergies    oxycodone (Other)    Intolerances        LABS:                        15.2   9.04  )-----------( 235      ( 14 Feb 2025 08:34 )             46.2     02-13    137  |  101  |  16  ----------------------------<  99  3.9   |  23  |  0.52    Ca    9.2      13 Feb 2025 16:36    TPro  6.7  /  Alb  4.1  /  TBili  0.8  /  DBili  x   /  AST  25  /  ALT  11  /  AlkPhos  96  02-13      Urinalysis Basic - ( 13 Feb 2025 16:36 )    Color: x / Appearance: x / SG: x / pH: x  Gluc: 99 mg/dL / Ketone: x  / Bili: x / Urobili: x   Blood: x / Protein: x / Nitrite: x   Leuk Esterase: x / RBC: x / WBC x   Sq Epi: x / Non Sq Epi: x / Bacteria: x      CAPILLARY BLOOD GLUCOSE          RADIOLOGY & ADDITIONAL TESTS: Reviewed.

## 2025-02-15 PROCEDURE — 99233 SBSQ HOSP IP/OBS HIGH 50: CPT | Mod: GC

## 2025-02-15 RX ADMIN — Medication 650 MILLIGRAM(S): at 18:14

## 2025-02-15 RX ADMIN — METOPROLOL SUCCINATE 25 MILLIGRAM(S): 50 TABLET, EXTENDED RELEASE ORAL at 10:56

## 2025-02-15 RX ADMIN — Medication 81 MILLIGRAM(S): at 10:55

## 2025-02-15 RX ADMIN — ROSUVASTATIN CALCIUM 20 MILLIGRAM(S): 20 TABLET, FILM COATED ORAL at 22:14

## 2025-02-15 RX ADMIN — Medication 650 MILLIGRAM(S): at 14:01

## 2025-02-15 RX ADMIN — POLYETHYLENE GLYCOL 3350 17 GRAM(S): 17 POWDER, FOR SOLUTION ORAL at 07:09

## 2025-02-15 RX ADMIN — ENOXAPARIN SODIUM 40 MILLIGRAM(S): 100 INJECTION SUBCUTANEOUS at 10:56

## 2025-02-15 RX ADMIN — Medication 650 MILLIGRAM(S): at 12:01

## 2025-02-15 RX ADMIN — METHYLPREDNISOLONE ACETATE 4 MILLIGRAM(S): 80 INJECTION, SUSPENSION INTRA-ARTICULAR; INTRALESIONAL; INTRAMUSCULAR; SOFT TISSUE at 18:15

## 2025-02-15 RX ADMIN — METHYLPREDNISOLONE ACETATE 4 MILLIGRAM(S): 80 INJECTION, SUSPENSION INTRA-ARTICULAR; INTRALESIONAL; INTRAMUSCULAR; SOFT TISSUE at 14:21

## 2025-02-15 RX ADMIN — METHYLPREDNISOLONE ACETATE 4 MILLIGRAM(S): 80 INJECTION, SUSPENSION INTRA-ARTICULAR; INTRALESIONAL; INTRAMUSCULAR; SOFT TISSUE at 22:14

## 2025-02-15 RX ADMIN — DULOXETINE 30 MILLIGRAM(S): 20 CAPSULE, DELAYED RELEASE ORAL at 12:01

## 2025-02-15 RX ADMIN — ENOXAPARIN SODIUM 40 MILLIGRAM(S): 100 INJECTION SUBCUTANEOUS at 22:14

## 2025-02-15 RX ADMIN — Medication 650 MILLIGRAM(S): at 07:12

## 2025-02-15 RX ADMIN — Medication 650 MILLIGRAM(S): at 19:14

## 2025-02-15 RX ADMIN — Medication 650 MILLIGRAM(S): at 22:14

## 2025-02-15 RX ADMIN — METHYLPREDNISOLONE ACETATE 4 MILLIGRAM(S): 80 INJECTION, SUSPENSION INTRA-ARTICULAR; INTRALESIONAL; INTRAMUSCULAR; SOFT TISSUE at 07:09

## 2025-02-15 RX ADMIN — Medication 40 MILLIGRAM(S): at 07:09

## 2025-02-15 NOTE — DIETITIAN INITIAL EVALUATION ADULT - OTHER INFO
83yo F with PMH of HTN, TIA (2017, no residual deficits), osteoarthritis of the spine who presents with intractible back pain x few weeks, worse since last night, recent L4 fracture now s/p kyphoplasty on 2/12. admitted for uncontrolled back pain with difficulty ambulating.     Patient seen at bedside for nutrition assessment. Current diet order: regular. No known food allergies. No difficulty chewing/swallowing reported. Reports decreased appetite since 1/26 which she attributes to starting a new medication. Says her appetite has been good in the hospital besides some dry mouth. Provided nutrition education - see below. Dosing weight: 202 pounds, BMI 36.9,  pounds. Endorses weight loss of a few pounds recently due to eating less for ~3 weeks, however not clinically significant. Based on ASPEN guidelines, patient meets criteria for mild malnutrition due to decreased PO intake and non-significant weight loss. No pressure injuries documented. +2 edema to BL legs. Denies nausea/vomiting/diarrhea - endorses chronic constipation, ordered for bowel regimen. Nutritionally pertinent labs wnl. Meds: bowel regimen, protonix. Observed with no overt signs and symptoms of muscle or fat wasting. See nutrition recommendations below.

## 2025-02-15 NOTE — DIETITIAN INITIAL EVALUATION ADULT - PERTINENT MEDS FT
MEDICATIONS  (STANDING):  acetaminophen     Tablet .. 650 milliGRAM(s) Oral every 6 hours  aspirin enteric coated 81 milliGRAM(s) Oral every 24 hours  DULoxetine 30 milliGRAM(s) Oral daily  enoxaparin Injectable 40 milliGRAM(s) SubCutaneous every 12 hours  lidocaine   4% Patch 1 Patch Transdermal every 24 hours  methylPREDNISolone   Oral   methylPREDNISolone 4 milliGRAM(s) Oral before breakfast  methylPREDNISolone 4 milliGRAM(s) Oral after lunch  methylPREDNISolone 4 milliGRAM(s) Oral after dinner  methylPREDNISolone 4 milliGRAM(s) Oral at bedtime  metoprolol succinate ER 25 milliGRAM(s) Oral every 24 hours  pantoprazole    Tablet 40 milliGRAM(s) Oral before breakfast  polyethylene glycol 3350 17 Gram(s) Oral every 24 hours  rosuvastatin 20 milliGRAM(s) Oral at bedtime  senna 2 Tablet(s) Oral at bedtime    MEDICATIONS  (PRN):  cyclobenzaprine 5 milliGRAM(s) Oral every 8 hours PRN Muscle Spasm  gabapentin 100 milliGRAM(s) Oral every 8 hours PRN Neuropathic Pain  ibuprofen  Tablet. 400 milliGRAM(s) Oral every 8 hours PRN Moderate Pain (4 - 6)  melatonin 3 milliGRAM(s) Oral at bedtime PRN Insomnia

## 2025-02-15 NOTE — OCCUPATIONAL THERAPY INITIAL EVALUATION ADULT - MODIFIED CLINICAL TEST OF SENSORY INTEGRATION IN BALANCE TEST
Pt able to ambulate 45 feet x2 with CGA using RW, demonstrating fairly steady gait, kyphotic posture, and decreased step length.

## 2025-02-15 NOTE — PROGRESS NOTE ADULT - SUBJECTIVE AND OBJECTIVE BOX
CC: Patient is a 84y old  Female who presents with a chief complaint of intractable back pain (14 Feb 2025 08:07)      INTERVAL EVENTS: CANDY    SUBJECTIVE: Patient seen and examined at bedside. Patient feels fine. Had a BM yesterday. Denies SOB, CP. ROS is otherwise negative.       ROS: negative unless otherwise stated above.        Vital Signs Last 24 Hrs  T(C): 36.4 (15 Feb 2025 06:28), Max: 36.9 (14 Feb 2025 21:29)  T(F): 97.5 (15 Feb 2025 06:28), Max: 98.4 (14 Feb 2025 21:29)  HR: 66 (15 Feb 2025 06:28) (66 - 74)  BP: 131/61 (15 Feb 2025 06:28) (131/61 - 137/71)  BP(mean): 90 (14 Feb 2025 21:29) (90 - 93)  RR: 18 (15 Feb 2025 06:28) (18 - 18)  SpO2: 94% (15 Feb 2025 06:28) (90% - 94%)    Parameters below as of 15 Feb 2025 06:28  Patient On (Oxygen Delivery Method): room air              PHYSICAL EXAM:    General: NAD  HEENT: MMM  Neck: supple  Cardiovascular: +S1/S2; RRR  Respiratory: CTA B/L; no W/R/R  Gastrointestinal: soft, NT/ND  Extremities: WWP; trace pit edema, no clubbing or cyanosis  Trunk/spine: TTP to the left side of the lumbar back   Vascular: 2+ radial, DP/PT pulses B/L  Neurological: AAOx3; no focal deficits, 5/5 strength in b/l LE extremities  Psych: normal affect    MEDICATIONS  (STANDING):  acetaminophen     Tablet .. 650 milliGRAM(s) Oral every 6 hours  aspirin enteric coated 81 milliGRAM(s) Oral every 24 hours  DULoxetine 30 milliGRAM(s) Oral daily  enoxaparin Injectable 40 milliGRAM(s) SubCutaneous every 12 hours  lidocaine   4% Patch 1 Patch Transdermal every 24 hours  methylPREDNISolone   Oral   methylPREDNISolone 4 milliGRAM(s) Oral before breakfast  methylPREDNISolone 4 milliGRAM(s) Oral after lunch  methylPREDNISolone 4 milliGRAM(s) Oral after dinner  methylPREDNISolone 4 milliGRAM(s) Oral at bedtime  metoprolol succinate ER 25 milliGRAM(s) Oral every 24 hours  pantoprazole    Tablet 40 milliGRAM(s) Oral before breakfast  polyethylene glycol 3350 17 Gram(s) Oral every 24 hours  rosuvastatin 20 milliGRAM(s) Oral at bedtime  senna 2 Tablet(s) Oral at bedtime    MEDICATIONS  (PRN):  cyclobenzaprine 5 milliGRAM(s) Oral every 8 hours PRN Muscle Spasm  gabapentin 100 milliGRAM(s) Oral every 8 hours PRN Neuropathic Pain  ibuprofen  Tablet. 400 milliGRAM(s) Oral every 8 hours PRN Moderate Pain (4 - 6)  melatonin 3 milliGRAM(s) Oral at bedtime PRN Insomnia        ALLERGIES:  Allergies    oxycodone (Other)    Intolerances        LABS:                          15.2   9.04  )-----------( 235      ( 14 Feb 2025 08:34 )             46.2   02-14    136  |  100  |  21  ----------------------------<  135[H]  4.0   |  24  |  0.51    Ca    9.5      14 Feb 2025 08:34  Phos  3.0     02-14  Mg     2.2     02-14    TPro  6.7  /  Alb  4.1  /  TBili  0.8  /  DBili  x   /  AST  25  /  ALT  11  /  AlkPhos  96  02-13          Urinalysis Basic - ( 13 Feb 2025 16:36 )    Color: x / Appearance: x / SG: x / pH: x  Gluc: 99 mg/dL / Ketone: x  / Bili: x / Urobili: x   Blood: x / Protein: x / Nitrite: x   Leuk Esterase: x / RBC: x / WBC x   Sq Epi: x / Non Sq Epi: x / Bacteria: x      CAPILLARY BLOOD GLUCOSE          RADIOLOGY & ADDITIONAL TESTS: Reviewed.

## 2025-02-15 NOTE — PROGRESS NOTE ADULT - PROBLEM SELECTOR PLAN 3
Patient c/o LLQ pain that began yesterday, now resolved. Has a hx of diverticulitis with similar symptoms in the past. No change in bowel habits. VSS. Exam benign. CT A/P with no evidence of bowel obstruction or inflammation, although does have diverticulosis with moderate amount of stool. Pain may be 2/2 constipation v. referred i/s/o back pain.     2/14 Last BM 3 days ago    - f/u UA with reflex cx  - bowel regimen: miralax, senna  - monitor abdominal exam

## 2025-02-15 NOTE — PROGRESS NOTE ADULT - PROBLEM SELECTOR PLAN 7
F: s/p 1L bolus  E replete as needed  N: regular  DVT ppx: lovenox  Dispo: F
F: s/p 1L bolus  E replete as needed  N: regular  DVT ppx: lovenox  Dispo: F

## 2025-02-15 NOTE — DIETITIAN INITIAL EVALUATION ADULT - PROBLEM SELECTOR PLAN 4
Two TIAs in ~2017 with ?vision changes/facial involvement, resolved spontaneously. Hx of stroke in father.   Home meds: ASA 81mg, crestor 20mg qhs  - continue home meds

## 2025-02-15 NOTE — PROGRESS NOTE ADULT - PROBLEM SELECTOR PLAN 1
Likely poorly controlled post-procedure pain i/s/o L4 fracture now s/p kyphoplasty on 2/12 (with Dr. Peng Chavez MD, Pain Management physician, Phone: [phone].)    No red flag symptoms on exam concerning for cord compression. Afebrile, minimal leukocytosis likely reactive i/s/o recent procedure, low concern for infectious etiology of pain at this time.     CT Pelvis and Lumbar spine 1/26 from [facility]  Suspected acute to subacute fracture involving the L4 superior endplate with mild height loss. No significant osseous retropulsion.  No definite acute fracture seen elsewhere. Lucency identified in the right greater trochanter on the same-day radiograph appears to be have been artifical. Multilevel degenerative change with significant canal and foraminal stenosis as detailed above, partially characterized.     - C/w Medrol dose pack  -Tylenol 650 mg q6 standing  -ibuprofen 400 MG Q8HRS PRN   -Flexeril 5mg q8hrs PRN   - PT consult rec RYAN  - fall and SPINE precautions  - if pain remains uncontrolled or patient with worsening neuro exam, will require further imaging

## 2025-02-15 NOTE — DIETITIAN INITIAL EVALUATION ADULT - PROBLEM SELECTOR PLAN 1
Likely poorly controlled post-procedure pain i/s/o L4 fracture now s/p kyphoplasty on 2/12. No red flag symptoms on exam concerning for cord compression. Afebrile, minimal leukocytosis likely reactive i/s/o recent procedure, low concern for infectious etiology of pain at this time.   - pain control: lidocaine patch standing, tylenol 650 q6h standing, flexeril 5mg q8h prn for spasms, ibuprofen 400mg q8h prn for moderate pain, toradol IV 15mg q8h prn for severe pain  - start Medrol dose pack (was prescribed but has not started taking this post-procedure)  - PT consult  - neuro checks q8h  - fall precautions  - if pain remains uncontrolled or patient with worsening neuro exam, will likely need ortho spine eval

## 2025-02-15 NOTE — DIETITIAN INITIAL EVALUATION ADULT - OTHER CALCULATIONS
pounds. Patient above % IBW (184%) thus ideal body weight used for all calculations. Needs adjusted for advanced age and malnutrition. Fluids per team in setting of edema.

## 2025-02-15 NOTE — PROGRESS NOTE ADULT - PROBLEM/PLAN-6
REASON FOR VISIT:    Chief Complaint   Patient presents with   • Follow-up     Crohns       LAST VISIT WITH ME:  2/13/2023    HISTORY OF PRESENT ILLNESS:    The patient is a pleasant 38 year old male who is being followed by GI and presenting for follow-up regarding Crohn's disease of large intestine.     Colonoscopy 2/16/2023 - grossly normal colon and terminal ileum with normal biopsies    Today he tells me he is overall doing well on maintenace therapy with mercaptopurine 50 mg daily.      Denies any flaring diarrhea, nocturnal diarrhea, urgency, tenesmus, fecal incontinence, melena, hematochezia, abdominal pain.    Labs 2/2023:  Normal CBC and CMP  Mild zinc (59) and vitamin D deficiencies (29)  No evidence of iron, vitamin B12 or folate deficiencies    Supplements - MVI daily      Patient offers no additional concerns or complaints during today's visit.  No constitutional symptoms of fatigue, fever or chills, anorexia or unintentional weight loss.     I have reviewed the patient's medications and allergies, past medical, surgical, social and family history, updating these as appropriate. See Histories section of the electronic medical record for a display of this information.    PAST MEDICAL HISTORY:    Crohn disease  (CMD)                                          PAST SURGICAL HISTORY:    HERNIA REPAIR                                   2014          COLONOSCOPY                                     08/04/2010      Comment: internal hemorrhoids, rectal tag    COLONOSCOPY                                     09/28/2011      Comment: transverse colon polyp, internal hemorrhoids    COLONOSCOPY                                     08/15/2018      Comment: negative     COLONOSCOPY                                     11/05/2020    ANAL EXAMINATION UNDER ANESTHESIA               05/16/2022    OPEN ACCESS COLONOSCOPY                         02/16/2023    Current Outpatient Medications   Medication Sig   • mercaptopurine 
(PURINETHOL) 50 MG tablet Take 1 tablet by mouth daily.   • clobetasol (TEMOVATE) 0.05 % ointment Apply topically daily as needed (hand/forearm rash until smooth).   • dicyclomine (BENTYL) 20 MG tablet Take 1 tablet by mouth 4 times daily as needed (Loose stools and abdominal pain). Generic ok   • clobetasol (TEMOVATE) 0.05 % cream Apply to the affected area in the arm or hand once a day as needed for rash.   • Ascorbic Acid (VITAMIN C) 1000 MG tablet Take 1,000 mg by mouth daily.   • calcium carbonate-vitamin D 500-125 MG-UNIT tablet Take 1 tablet by mouth daily.   • Omega-3 Fatty Acids (FISH OIL PO)    • Multiple Vitamins-Minerals (MULTIVITAMIN ADULT PO)      No current facility-administered medications for this visit.       ALLERGIES:  No Known Allergies    Family History   Problem Relation Age of Onset   • Cancer Father         bladder cancer   • Cancer, Breast Maternal Grandmother        Social History     Socioeconomic History   • Marital status: Single     Spouse name: Not on file   • Number of children: Not on file   • Years of education: Not on file   • Highest education level: Not on file   Occupational History   • Not on file   Tobacco Use   • Smoking status: Never   • Smokeless tobacco: Never   Substance and Sexual Activity   • Alcohol use: Yes     Alcohol/week: 6.0 standard drinks of alcohol     Types: 6 Cans of beer per week     Comment: rare   • Drug use: No   • Sexual activity: Not on file   Other Topics Concern   • Not on file   Social History Narrative   • Not on file     Social Determinants of Health     Financial Resource Strain: Not on file   Food Insecurity: Not on file   Transportation Needs: Not on file   Physical Activity: Not on file   Stress: Not on file   Social Connections: Not on file   Interpersonal Safety: Not At Risk (4/24/2021)    Interpersonal Safety    • Social Determinants: Intimate Partner Violence Past Fear: Not on file    • Social Determinants: Intimate Partner Violence 
Current Fear: Not on file       REVIEW OF SYSTEMS:  A complete review of systems was performed and found to be negative except for what was stated in the History of Present Illness.    PHYSICAL EXAM:  Vitals:  Visit Vitals  /84 (BP Location: LUE - Left upper extremity, Patient Position: Sitting, Cuff Size: Regular)   Pulse 78   Resp 20   Ht 5' 8\" (1.727 m)   Wt 99.3 kg (219 lb)   BMI 33.30 kg/m²       Constitutional:  Patient is alert and oriented x 3, pleasant and cooperative, in no acute distress. Well-developed, well-nourished.  Psychiatric: Normal mood and affect. Appropriate eye contact.  Physical examination otherwise deferred.    ASSESSMENT/PLAN:  1. Crohn's disease of large intestine without complication  (CMD) -chronic, well-controlled.  Appears to be in clinical remission.  Endoscopic/histologic remission noted on last colonoscopy 2/2023.  Mercaptopurine 50 mg daily.  Refills sent.  CBC, CMP,CRP every 6 months, due now  Zinc, folate, vitamin B 6 and B12, vitamin D, iron panel annually; due now  Colonoscopy last 2/2023; due 3/2025 (on recall list)  Influenza vaccine annually  Annual eye exam   Annual skin exam  Dental exam every 6-12 months- undergoes routinely  Depression screen annually, negative screening at today's visit   Smoking status annually, non-smoker      2. Dermatitis- Bilateral hands, face and neck.    Courtesy refill of clobetasol sent PRN for patient, as he does not have a current PCP. Encouraged patient to establish care with new PCP.        Pt was given opportunity to ask questions and questions were answered. They have been counseled on medication mechanism of action, usual dosing, administration timing, potential medication side effects and interactions and expected benefits. Pt verbalized full understanding of diagnosis, treatment rational and treatment plan and is in agreement with treatment plan.       Orders Placed This Encounter   • Iron And total Iron Binding Capacity   • 
Ferritin   • Vitamin B12 And Folate   • Vitamin D -25 Hydroxy   • Zinc   • Vitamin B6, Plasma   • CBC No Differential   • Comprehensive Metabolic Panel   • C Reactive Protein   • mercaptopurine (PURINETHOL) 50 MG tablet   • clobetasol (TEMOVATE) 0.05 % ointment         FOLLOW-UP:  Return in about 1 year (around 8/1/2025), or if symptoms worsen or fail to improve, for Crohns.        
DISPLAY PLAN FREE TEXT
DISPLAY PLAN FREE TEXT

## 2025-02-15 NOTE — DIETITIAN INITIAL EVALUATION ADULT - PROBLEM SELECTOR PLAN 3
Patient c/o LLQ pain that began yesterday, now resolved. Has a hx of diverticulitis with similar symptoms in the past. No change in bowel habits. VSS. Exam benign. CT A/P with no evidence of bowel obstruction or inflammation, although does have diverticulosis with moderate amount of stool. Pain may be 2/2 constipation v. referred i/s/o back pain.   - f/u UA with reflex cx  - bowel regimen: miralax, senna  - monitor abdominal exam Consent 2/Introductory Paragraph: Mohs surgery was explained to the patient and consent was obtained. The risks, benefits and alternatives to therapy were discussed in detail. Specifically, the risks of infection, scarring, bleeding, prolonged wound healing, incomplete removal, allergy to anesthesia, nerve injury and recurrence were addressed. Prior to the procedure, the treatment site was clearly identified and confirmed by the patient. All components of Universal Protocol/PAUSE Rule completed.

## 2025-02-15 NOTE — DIETITIAN NUTRITION RISK NOTIFICATION - TREATMENT: THE FOLLOWING DIET HAS BEEN RECOMMENDED
1. Continue with regular diet. Recommend MVI  2. Encourage pt to meet nutritional needs as able   3. Monitor labs: electrolytes, cbc  4. Monitor weights   5. Pain and bowel regimen per team   6. Will continue to assess/honor food preferences as able  7. Monitor adherence to diet education

## 2025-02-15 NOTE — DIETITIAN INITIAL EVALUATION ADULT - PERTINENT LABORATORY DATA
02-14    136  |  100  |  21  ----------------------------<  135[H]  4.0   |  24  |  0.51    Ca    9.5      14 Feb 2025 08:34  Phos  3.0     02-14  Mg     2.2     02-14    TPro  6.7  /  Alb  4.1  /  TBili  0.8  /  DBili  x   /  AST  25  /  ALT  11  /  AlkPhos  96  02-13

## 2025-02-15 NOTE — OCCUPATIONAL THERAPY INITIAL EVALUATION ADULT - PERTINENT HX OF CURRENT PROBLEM, REHAB EVAL
Patient is an 85yo F with PMH of HTN, TIA (2017, no residual deficits), osteoarthritis of the spine who presents with intractible back pain x few weeks, worse since last night. Patient reports symptoms initially started on 1/24/25 when she bent over to pick something up and proceeded to fall over the table, slipped while trying to get up and then hit her back and head. She had increasingly worse back pain and presented to [facility] ED 2 days after the fall with intractible back pain. Sates she was admitted for 2 days, seen by PT and cleared for discharge home. Since then, she saw an orthopedic physician at [facility] ([address]) who did an L4 kyphoplasty procedure on 2/12. Post-procedure, she was prescribed a Medrol dose pack, Ibuprofen 400mg tid prn, Cyclobenzaprine 5mg tid, Gabapentin 300mg (unclear frequency). and Tramadol 50-100mg q4-6h prn for severe pain. Since then, she has continued to have worsening pain despite taking Tramadol. Yesterday, the pain was so severe with movement that she could not get out of bed to use the bathroom i/s/o back pain, which led to an episode of urinating on herself. Currently, the pain is mostly concentrated on the L lower back, not tender at rest but worsens with movement. She does report some radiating pain down the back of both legs to the knees. Normally lives with her daughter and has HHA 4 hours per day since being discharged from [facility], but with her daughter currently traveling and not having much help at home, she is worried about taking care of herself i/s/o severe pain. Denies headache, chest pain, shortness of breath, abdominal pain currently. Did have an episode of LLQ pain a few days ago which she was concerned was i/s/o diverticulitis, but has now resolved. Does report constipation but denies any fevers/chills, blood in the stool. No focal weakness to legs, no numbness, tingling or changes to bowel or bladder habits. Does report some hx of dyspnea on exertion for which she had extensive cardiac and pulm testing which she reports was negative. Does report getting tested for sleep apnea which was "borderline", reports she snores at night and wakes up tired every day. No other complaints.

## 2025-02-15 NOTE — PROGRESS NOTE ADULT - PROBLEM SELECTOR PLAN 2
Recent fracture of L4, now s/p vertebroplasty at private practise on 2/12.     CT Pelvis and Lumbar spine 1/26 from [facility]  Suspected acute to subacute fracture involving the L4 superior endplate with mild height loss. No significant osseous retropulsion.  No definite acute fracture seen elsewhere. Lucency identified in the right greater trochanter on the same-day radiograph appears to be have been artifical. Multilevel degenerative change with significant canal and foraminal stenosis as detailed above, partially characterized.   - pain control as above

## 2025-02-15 NOTE — OCCUPATIONAL THERAPY INITIAL EVALUATION ADULT - DIAGNOSIS, OT EVAL
Pt admitted s/p intractable back pain, presents with impaired balance, strength deficits, and decreased activity tolerance.

## 2025-02-15 NOTE — PROGRESS NOTE ADULT - PROBLEM SELECTOR PLAN 6
Patient with incidental finding of 5.6cm R adnexal cyst on CT.  - outpatient pelvic ultrasound
Patient with incidental finding of 5.6cm R adnexal cyst on CT.  - outpatient pelvic ultrasound

## 2025-02-15 NOTE — PROGRESS NOTE ADULT - PROBLEM SELECTOR PLAN 4
Two TIAs in ~2017 with ?vision changes/facial involvement, resolved spontaneously. Hx of stroke in father.   Home meds: ASA 81mg, crestor 20mg qhs  - continue home meds
Two TIAs in ~2017 with ?vision changes/facial involvement, resolved spontaneously. Hx of stroke in father.   Home meds: ASA 81mg, crestor 20mg qhs  - continue home meds

## 2025-02-15 NOTE — DIETITIAN INITIAL EVALUATION ADULT - PERSON TAUGHT/METHOD
Provided nutrition education discussing importance of adequate fruits and vegetables, whole grains, protein, and food sources of protein, importance of adequate fiber, food sources of fiber. Patient verbalized understanding./verbal instruction/patient instructed

## 2025-02-15 NOTE — OCCUPATIONAL THERAPY INITIAL EVALUATION ADULT - ADDITIONAL COMMENTS
Pt states she lives with her daughter in an elevator accessible apartment, with 5 MARVIN. Pt reports requiring assistance for ADLs/IADLs and ambulating with cane. Pt owns a walker and grab bars. Pt has HHA services 2-6PM daily.

## 2025-02-16 VITALS
SYSTOLIC BLOOD PRESSURE: 137 MMHG | HEART RATE: 74 BPM | OXYGEN SATURATION: 92 % | TEMPERATURE: 98 F | DIASTOLIC BLOOD PRESSURE: 66 MMHG | RESPIRATION RATE: 18 BRPM

## 2025-02-16 PROCEDURE — 80048 BASIC METABOLIC PNL TOTAL CA: CPT

## 2025-02-16 PROCEDURE — 97161 PT EVAL LOW COMPLEX 20 MIN: CPT

## 2025-02-16 PROCEDURE — 99285 EMERGENCY DEPT VISIT HI MDM: CPT

## 2025-02-16 PROCEDURE — 87637 SARSCOV2&INF A&B&RSV AMP PRB: CPT

## 2025-02-16 PROCEDURE — 93005 ELECTROCARDIOGRAM TRACING: CPT

## 2025-02-16 PROCEDURE — 36415 COLL VENOUS BLD VENIPUNCTURE: CPT

## 2025-02-16 PROCEDURE — 71045 X-RAY EXAM CHEST 1 VIEW: CPT | Mod: 26

## 2025-02-16 PROCEDURE — 80053 COMPREHEN METABOLIC PANEL: CPT

## 2025-02-16 PROCEDURE — 83735 ASSAY OF MAGNESIUM: CPT

## 2025-02-16 PROCEDURE — 96374 THER/PROPH/DIAG INJ IV PUSH: CPT

## 2025-02-16 PROCEDURE — 84100 ASSAY OF PHOSPHORUS: CPT

## 2025-02-16 PROCEDURE — 97165 OT EVAL LOW COMPLEX 30 MIN: CPT

## 2025-02-16 PROCEDURE — 85027 COMPLETE CBC AUTOMATED: CPT

## 2025-02-16 PROCEDURE — 74177 CT ABD & PELVIS W/CONTRAST: CPT | Mod: MC

## 2025-02-16 PROCEDURE — 99239 HOSP IP/OBS DSCHRG MGMT >30: CPT

## 2025-02-16 PROCEDURE — 85025 COMPLETE CBC W/AUTO DIFF WBC: CPT

## 2025-02-16 PROCEDURE — 71045 X-RAY EXAM CHEST 1 VIEW: CPT

## 2025-02-16 RX ORDER — METHYLPREDNISOLONE ACETATE 80 MG/ML
0 INJECTION, SUSPENSION INTRA-ARTICULAR; INTRALESIONAL; INTRAMUSCULAR; SOFT TISSUE
Refills: 0 | DISCHARGE

## 2025-02-16 RX ORDER — DIAZEPAM 2 MG/1
2 TABLET ORAL THREE TIMES A DAY
Refills: 0 | Status: DISCONTINUED | OUTPATIENT
Start: 2025-02-16 | End: 2025-02-16

## 2025-02-16 RX ORDER — TRAMADOL HYDROCHLORIDE 50 MG/1
1 TABLET, FILM COATED ORAL
Refills: 0 | DISCHARGE

## 2025-02-16 RX ORDER — DIAZEPAM 2 MG/1
2 TABLET ORAL ONCE
Refills: 0 | Status: COMPLETED | OUTPATIENT
Start: 2025-02-16 | End: 2025-02-16

## 2025-02-16 RX ORDER — ACETAMINOPHEN 500 MG/5ML
2 LIQUID (ML) ORAL
Qty: 0 | Refills: 0 | DISCHARGE
Start: 2025-02-16

## 2025-02-16 RX ORDER — IBUPROFEN 200 MG
1 TABLET ORAL
Refills: 0 | DISCHARGE

## 2025-02-16 RX ORDER — LIDOCAINE HYDROCHLORIDE 20 MG/ML
1 JELLY TOPICAL
Qty: 0 | Refills: 0 | DISCHARGE
Start: 2025-02-16

## 2025-02-16 RX ADMIN — Medication 650 MILLIGRAM(S): at 18:21

## 2025-02-16 RX ADMIN — DIAZEPAM 2 MILLIGRAM(S): 2 TABLET ORAL at 13:33

## 2025-02-16 RX ADMIN — ENOXAPARIN SODIUM 40 MILLIGRAM(S): 100 INJECTION SUBCUTANEOUS at 10:53

## 2025-02-16 RX ADMIN — Medication 81 MILLIGRAM(S): at 10:53

## 2025-02-16 RX ADMIN — Medication 650 MILLIGRAM(S): at 17:21

## 2025-02-16 RX ADMIN — Medication 40 MILLIGRAM(S): at 06:26

## 2025-02-16 RX ADMIN — METOPROLOL SUCCINATE 25 MILLIGRAM(S): 50 TABLET, EXTENDED RELEASE ORAL at 10:53

## 2025-02-16 RX ADMIN — METHYLPREDNISOLONE ACETATE 4 MILLIGRAM(S): 80 INJECTION, SUSPENSION INTRA-ARTICULAR; INTRALESIONAL; INTRAMUSCULAR; SOFT TISSUE at 13:34

## 2025-02-16 RX ADMIN — METHYLPREDNISOLONE ACETATE 4 MILLIGRAM(S): 80 INJECTION, SUSPENSION INTRA-ARTICULAR; INTRALESIONAL; INTRAMUSCULAR; SOFT TISSUE at 06:26

## 2025-02-16 RX ADMIN — DIAZEPAM 2 MILLIGRAM(S): 2 TABLET ORAL at 17:21

## 2025-02-16 RX ADMIN — Medication 650 MILLIGRAM(S): at 06:26

## 2025-02-16 RX ADMIN — DULOXETINE 30 MILLIGRAM(S): 20 CAPSULE, DELAYED RELEASE ORAL at 11:52

## 2025-02-16 RX ADMIN — Medication 650 MILLIGRAM(S): at 10:53

## 2025-02-16 RX ADMIN — Medication 650 MILLIGRAM(S): at 11:53

## 2025-02-16 NOTE — DISCHARGE NOTE PROVIDER - DETAILS OF MALNUTRITION DIAGNOSIS/DIAGNOSES
This patient has been assessed with a concern for Malnutrition and was treated during this hospitalization for the following Nutrition diagnosis/diagnoses:     -  02/15/2025: Mild protein-calorie malnutrition

## 2025-02-16 NOTE — DISCHARGE NOTE PROVIDER - ATTENDING DISCHARGE PHYSICAL EXAMINATION:
Patient was seen and examined at bedside on 2/16/2025 at 11 am with RN present. Patient reports continued back pain, worsened with movement but improved from initial presentation. Denies SOB. ROS is otherwise negative. Vitals, labwork and pertinent imaging reviewed. Exam - NAD, AAO x 4, PERRLA, EOMI, MMM, supple neck, chest - CTA b/l, CV - rrr, s1s2, no m/r/g, no JVD, abd - soft, NTND, + BS, back - midline, ext - wwp, psych - normal affect, skin - no rash    Plan:  -C/w pain control  -Tylenol standing, Lidocaine patch, hot pack  -Add Valium PRN  -Add IS  -Patient is medically ready for d/c to [facility]  -CXR showing possible LLL atelectasis - start IS, reassess

## 2025-02-16 NOTE — PROGRESS NOTE ADULT - ASSESSMENT
I M     84 y o F with PMH of HTN, TIA (2017, no residual deficits), osteoarthritis of the spine who presents with intractible back pain x few weeks, worse since last night, admitted for uncontrolled back pain with difficulty ambulating.     Problem/Plan - 1:  ·  Problem: Acute back pain.   ·  Plan: Likely poorly controlled post-procedure pain i/s/o L4 fracture now s/p kyphoplasty on 2/12 (with Dr. Peng Chavez MD, Pain Management physician, Phone: [phone].)    No red flag symptoms on exam concerning for cord compression. Afebrile, minimal leukocytosis likely reactive i/s/o recent procedure, low concern for infectious etiology of pain at this time.     CT Pelvis and Lumbar spine 1/26 from [facility]  Suspected acute to subacute fracture involving the L4 superior endplate with mild height loss. No significant osseous retropulsion.  No definite acute fracture seen elsewhere. Lucency identified in the right greater trochanter on the same-day radiograph appears to be have been artifical. Multilevel degenerative change with significant canal and foraminal stenosis as detailed above, partially characterized.     - Medrol dose pack (was prescribed but has not started taking this post-procedure)  -Tylenol 650 mg q6   -ibuprofen 400 MG Q8HRS PRN   -Flexeril 5mg q8hrs PRN   - f/u PT consult  - fall and SPINE precautions  - if pain remains uncontrolled or patient with worsening neuro exam, will require further imaging.    Problem/Plan - 2:  ·  Problem: Compression fracture of L4 vertebra.   ·  Plan: Recent fracture of L4, now s/p vertebroplasty at private practise on 2/12.     CT Pelvis and Lumbar spine 1/26 from [facility]  Suspected acute to subacute fracture involving the L4 superior endplate with mild height loss. No significant osseous retropulsion.  No definite acute fracture seen elsewhere. Lucency identified in the right greater trochanter on the same-day radiograph appears to be have been artifical. Multilevel degenerative change with significant canal and foraminal stenosis as detailed above, partially characterized.   - pain control as above.    Problem/Plan - 3:  ·  Problem: Abdominal pain, acute.   ·  Plan: Patient c/o LLQ pain that began yesterday, now resolved. Has a hx of diverticulitis with similar symptoms in the past. No change in bowel habits. VSS. Exam benign. CT A/P with no evidence of bowel obstruction or inflammation, although does have diverticulosis with moderate amount of stool. Pain may be 2/2 constipation v. referred i/s/o back pain.     2/14 Last BM 3 days ago    - f/u UA with reflex cx  - bowel regimen: miralax, senna  - monitor abdominal exam.    Problem/Plan - 4:  ·  Problem: History of TIAs.   ·  Plan: Two TIAs in ~2017 with ?vision changes/facial involvement, resolved spontaneously. Hx of stroke in father.   Home meds: ASA 81mg, crestor 20mg qhs  - continue home meds.    Problem/Plan - 5:  ·  Problem: HTN (hypertension).   ·  Plan: Home med: toprol 25mg qd  - continue home med.    Problem/Plan - 6:  ·  Problem: Adnexal mass.   ·  Plan: Patient with incidental finding of 5.6cm R adnexal cyst on CT.  - outpatient pelvic ultrasound.    Problem/Plan - 7:  ·  Problem: Prophylactic measure.   ·  Plan: F: s/p 1L bolus  E replete as needed  N: regular  DVT ppx: lovenox  Dispo: F.  
Patient is an 85yo F with PMH of HTN, TIA (2017, no residual deficits), osteoarthritis of the spine who presents with intractible back pain x few weeks, worse since last night, admitted for uncontrolled back pain with difficulty ambulating. 
Patient is an 85yo F with PMH of HTN, TIA (2017, no residual deficits), osteoarthritis of the spine who presents with intractible back pain x few weeks, worse since last night, admitted for uncontrolled back pain with difficulty ambulating.

## 2025-02-16 NOTE — DISCHARGE NOTE NURSING/CASE MANAGEMENT/SOCIAL WORK - FINANCIAL ASSISTANCE
[facility] provides services at a reduced cost to those who are determined to be eligible through [facility]’s financial assistance program. Information regarding [facility]’s financial assistance program can be found by going to [email or web address] or by calling [phone].

## 2025-02-16 NOTE — PROGRESS NOTE ADULT - SUBJECTIVE AND OBJECTIVE BOX
Physical Medicine and Rehabilitation Progress Note :       Patient is a 84y old  Female who presents with a chief complaint of POSTOPERATIVE BACK PAINLLQ PAIN     (15 Feb 2025 14:47)      HPI:  HPI: Patient is an 83yo F with PMH of HTN, TIA (2017, no residual deficits), osteoarthritis of the spine who presents with intractible back pain x few weeks, worse since last night. Patient reports symptoms initially started on 1/24/25 when she bent over to pick something up and proceeded to fall over the table, slipped while trying to get up and then hit her back and head. She had increasingly worse back pain and presented to [facility] ED 2 days after the fall with intractible back pain. Sates she was admitted for 2 days, seen by PT and cleared for discharge home. Since then, she saw an orthopedic physician at [facility] ([address]) who did an L4 kyphoplasty procedure on 2/12. Post-procedure, she was prescribed a Medrol dose pack, Ibuprofen 400mg tid prn, Cyclobenzaprine 5mg tid, Gabapentin 300mg (unclear frequency). and Tramadol 50-100mg q4-6h prn for severe pain. Since then, she has continued to have worsening pain despite taking Tramadol. Yesterday, the pain was so severe with movement that she could not get out of bed to use the bathroom i/s/o back pain, which led to an episode of urinating on herself. Currently, the pain is mostly concentrated on the L lower back, not tender at rest but worsens with movement. She does report some radiating pain down the back of both legs to the knees. Normally lives with her daughter and has HHA 4 hours per day since being discharged from [facility], but with her daughter currently traveling and not having much help at home, she is worried about taking care of herself i/s/o severe pain. Denies headache, chest pain, shortness of breath, abdominal pain currently. Did have an episode of LLQ pain a few days ago which she was concerned was i/s/o diverticulitis, but has now resolved. Does report constipation but denies any fevers/chills, blood in the stool. No focal weakness to legs, no numbness, tingling or changes to bowel or bladder habits. Does report some hx of dyspnea on exertion for which she had extensive cardiac and pulm testing which she reports was negative. Does report getting tested for sleep apnea which was "borderline", reports she snores at night and wakes up tired every day. No other complaints.     In the ED:  Initial Vital Signs: T 98.4 F, HR 85, /76, RR 18, SpO2 97% on room air  Labs: significant for WBC 13.10, Hgb 15.3  EKG: normal sinus rhythm, QTc 452  CXR: no consolidations noted  CT A/P: No bowel obstruction or inflammation. Moderate amount of stool throughout the colon. Scattered diverticulosis without evidence of acute diverticulitis. Postprocedural changes of recent L4 vertebroplasty. Incidental 5.6 cm right adnexal cyst. Recommend further characterization with outpatient pelvic ultrasound.   Interventions: NS 1L bolus, toradol 30mg IV x 1, flexeril 10mg PO x 1  Consults: none (13 Feb 2025 21:07)                Vital Signs Last 24 Hrs  T(C): 37 (16 Feb 2025 05:56), Max: 37 (16 Feb 2025 05:56)  T(F): 98.6 (16 Feb 2025 05:56), Max: 98.6 (16 Feb 2025 05:56)  HR: 64 (16 Feb 2025 05:56) (64 - 87)  BP: 139/71 (16 Feb 2025 05:56) (106/70 - 139/71)  BP(mean): 82 (15 Feb 2025 11:30) (82 - 82)  RR: 18 (16 Feb 2025 05:56) (18 - 18)  SpO2: 92% (16 Feb 2025 05:56) (92% - 94%)    Parameters below as of 15 Feb 2025 21:05  Patient On (Oxygen Delivery Method): room air        MEDICATIONS  (STANDING):  acetaminophen     Tablet .. 650 milliGRAM(s) Oral every 6 hours  aspirin enteric coated 81 milliGRAM(s) Oral every 24 hours  DULoxetine 30 milliGRAM(s) Oral daily  enoxaparin Injectable 40 milliGRAM(s) SubCutaneous every 12 hours  lidocaine   4% Patch 1 Patch Transdermal every 24 hours  methylPREDNISolone   Oral   methylPREDNISolone 4 milliGRAM(s) Oral before breakfast  methylPREDNISolone 4 milliGRAM(s) Oral after lunch  methylPREDNISolone 4 milliGRAM(s) Oral at bedtime  metoprolol succinate ER 25 milliGRAM(s) Oral every 24 hours  pantoprazole    Tablet 40 milliGRAM(s) Oral before breakfast  polyethylene glycol 3350 17 Gram(s) Oral every 24 hours  rosuvastatin 20 milliGRAM(s) Oral at bedtime  senna 2 Tablet(s) Oral at bedtime    MEDICATIONS  (PRN):  cyclobenzaprine 5 milliGRAM(s) Oral every 8 hours PRN Muscle Spasm  gabapentin 100 milliGRAM(s) Oral every 8 hours PRN Neuropathic Pain  ibuprofen  Tablet. 400 milliGRAM(s) Oral every 8 hours PRN Moderate Pain (4 - 6)  melatonin 3 milliGRAM(s) Oral at bedtime PRN Insomnia      T(C): 37 (02-16-25 @ 05:56), Max: 37 (02-16-25 @ 05:56)  HR: 64 (02-16-25 @ 05:56) (64 - 87)  BP: 139/71 (02-16-25 @ 05:56) (106/70 - 139/71)  RR: 18 (02-16-25 @ 05:56) (18 - 18)  SpO2: 92% (02-16-25 @ 05:56) (92% - 94%)    Physical Exam:   84 y o woman lying comfortably in semi Horner's position , awake , alert , no acute complaints     Head: normocephalic , atraumatic    Eyes: PERRLA , EOMI , no nystagmus , sclera anicteric    ENT / FACE: neg nasal discharge , uvula midline , no oropharyngeal erythema / exudate    Neck: supple , negative JVD , negative carotid bruits , no thyromegaly    Chest: CTA bilaterally     Cardiovascular: regular rate and rhythm , neg murmurs / rubs / gallops    Abdomen: soft , non distended , no tenderness to palpation in all 4 quadrants ,  normal bowel sounds     Extremities: WWP , neg cyanosis /clubbing / edema     Musculoskeletal:  L2-4 ttp, neg SLR      Neurologic Exam:     Alert and oriented to person , place , date/year     Motor Exam:        > 3+/5 x 4 extremities     Sensation:         intact to light touch x 4 extremities                                DTR:           biceps/brachioradialis: equal                            patella/ankle: equal          neg clonus        2/15/2025  Functional Status Assessment :     Previous Level of Function:     · Bed Mobility/Transfers	needed assist  · Bathing	needed assist  · Upper Body Dressing	needed assist  · Lower Body Dressing	needed assist  · Grooming	independent  · Toileting	needed assist  · Eating	independent  · Home Management Skills	needed assist  · Additional Comments	Pt states she lives with her daughter in an elevator accessible apartment, with 5 MARVIN. Pt reports requiring assistance for ADLs/IADLs and ambulating with cane. Pt owns a walker and grab bars. Pt has HHA services 2-6PM daily.      Cognitive Status Examination:   · Level of Consciousness	alert; confused  · Orientation	oriented to person, place, time and situation  · Follow Commands/Answers Questions	100% of the time; able to follow multistep instructions  · Personal Safety and Judgment	intact  · Short Term Memory	impaired  · Long Term Memory	impaired    Range of Motion Exam:   · Range of Motion Examination	no Active ROM deficits were identified    Manual Muscle Testing:   · Manual Muscle Testing Results	B UE and B LE >3+/5 upon functional assessment against gravity.    Bed Mobility: Rolling/Turning:     · Level of Prince	minimum assist (75% patients effort); log roll technique  · Physical Assist/Nonphysical Assist	1 person assist; nonverbal cues (demo/gestures); verbal cues    Bed Mobility: Scooting/Bridging:     · Level of Prince	minimum assist (75% patients effort)  · Physical Assist/Nonphysical Assist	1 person assist; nonverbal cues (demo/gestures); verbal cues    Bed Mobility: Sit to Supine:     · Level of Prince	minimum assist (75% patients effort)  · Physical Assist/Nonphysical Assist	1 person assist; nonverbal cues (demo/gestures); verbal cues    Bed Mobility: Supine to Sit:     · Level of Prince	minimum assist (75% patients effort)  · Physical Assist/Nonphysical Assist	1 person assist; nonverbal cues (demo/gestures); verbal cues    Bed Mobility Analysis:     · Impairments Contributing to Impaired Bed Mobility	impaired balance; decreased strength; pain    Transfer: Sit to Stand:     · Level of Prince	minimum assist (75% patients effort)  · Physical Assist/Nonphysical Assist	1 person assist; nonverbal cues (demo/gestures); verbal cues  · Assistive Device	rolling walker    Transfer: Stand to Sit:     · Level of Prince	minimum assist (75% patients effort)  · Physical Assist/Nonphysical Assist	1 person assist; nonverbal cues (demo/gestures); verbal cues  · Assistive Device	rolling walker    Sit/Stand Transfer Safety Analysis:     · Impairments Contributing to Impaired Transfers	impaired balance; decreased strength    Balance Skills Assessment:     · Sitting Balance: Static	good balance  · Sitting Balance: Dynamic	good minus  · Sit-to-Stand Balance	fair plus  · Standing Balance: Static	good minus  · Standing Balance: Dynamic	fair plus    Sensory Examination:   · Balance Skills	Pt able to ambulate 45 feet x2 with CGA using RW, demonstrating fairly steady gait, kyphotic posture, and decreased step length.      Upper Body Dressing Training:     · Level of Prince	contact guard; don hospital gown  · Physical Assist/Nonphysical Assist	1 person assist; nonverbal cues (demo/gestures); verbal cues          PM&R Impression : as above    Current disposition plan recommendation :    subacute rehab placement

## 2025-02-16 NOTE — DISCHARGE NOTE PROVIDER - HOSPITAL COURSE
#Discharge: do not delete    Patient is an 85yo F with PMH of HTN, TIA (2017, no residual deficits), osteoarthritis of the spine who presents with back pain  s/p kyphoplasty procedure at a private clinic on 2/12, admitted for pain management of said back pain.       Hospital course (by problem):     #Lower back pain post Kyphoplasty     The patient is experiencing likely poorly controlled post-procedure pain following a kyphoplasty procedure for an L4 fracture on 2/12 by Dr. Peng Chavez MD, a Pain Management physician. Contact: [phone]. Upon examination, no red flag symptoms were observed that would suggest cord compression. The patient is afebrile, and there is minimal leukocytosis, which is likely reactive due to the recent procedure, leading to a low concern for an infectious etiology of the pain at this time. A CT of the pelvis and lumbar spine conducted on 1/26 at [facility] revealed a suspected acute to subacute fracture involving the L4 superior endplate with mild height loss, without significant osseous retropulsion. No definite acute fracture was seen elsewhere. A lucency identified in the right greater trochanter on the same-day radiograph appears to have been artificial. There are multilevel degenerative changes with significant canal and foraminal stenosis, partially characterized as detailed above. The patient is tp be discharged to a [facility] for intensive physical therapy.   - Please complete your Medrol dose pack  -Take Tylenol 650 mg every 6 hrs as needed for back pain.   -You may continue to take the muscle relaxant Flexeril 5mg tablet every 8 hrs as needed for back pain for one more week, and then please discontinue to take it.   -Please follow up with Dr. Chavez regarding your kyphoplasty procedure and your recent hospitalization    Patient was discharged to: [facility]     New medications: Valium 2 mg   Changes to old medications: none   Medications that were stopped: none     Items to follow up as outpatient: Follow up with Dr. Dr. Peng Chavez,    Physical exam at the time of discharge:    General: NAD  HEENT: MMM  Neck: supple  Cardiovascular: +S1/S2; RRR  Respiratory: CTA B/L; no W/R/R  Gastrointestinal: soft, NT/ND  Extremities: WWP; trace pit edema, no clubbing or cyanosis  Trunk/spine: TTP to the left side of the lumbar back   Vascular: 2+ radial, DP/PT pulses B/L  Neurological: AAOx3; no focal deficits

## 2025-02-16 NOTE — DISCHARGE NOTE NURSING/CASE MANAGEMENT/SOCIAL WORK - NSDPFAC_GEN_ALL_CORE
[facility], [address] [address], P: [phone]

## 2025-02-16 NOTE — PROGRESS NOTE ADULT - ATTENDING COMMENTS
Patient was seen and examined at bedside on 2/16/2025 at 11 am with RN present. Patient reports continued back pain, worsened with movement but improved from initial presentation. Denies SOB. ROS is otherwise negative. Vitals, labwork and pertinent imaging reviewed. Exam - NAD, AAO x 4, PERRLA, EOMI, MMM, supple neck, chest - CTA b/l, CV - rrr, s1s2, no m/r/g, no JVD, abd - soft, NTND, + BS, back - midline, ext - wwp, psych - normal affect, skin - no rash    Plan:  -C/w pain control  -Tylenol standing, Lidocaine patch, hot pack  -Add Valium PRN  -Add IS  -Patient is medically ready for d/c to [facility]  -CXR showing possible LLL atelectasis - start IS, reassess
85yo F with PMH of HTN, TIA (2017, no residual deficits), osteoarthritis of the spine who presents with intractable back pain since kyphoplasty on 2/12/25, admitted for uncontrolled back pain with difficulty ambulating.     Plan   -no red flag s/s; back pain now improved  CTM. Patient was not taking prescribed pain meds at home since the procedure on 2/12.   -c/w multimodal pain control w/ PRN tramadol, ibuprofen, tylenol, lidocain patch, gabapentin, flexeril   -Continue medrol dose pack as prescribed by outpatient physician that did the procedure   -c/w bowel regimen    -Repeat imaging if pain worsening or develops any neurologic deficits     PT recs for [facility]

## 2025-02-16 NOTE — DISCHARGE NOTE NURSING/CASE MANAGEMENT/SOCIAL WORK - PATIENT PORTAL LINK FT
You can access the FollowMyHealth Patient Portal offered by [facility] by registering at the following website: [email or web address]. By joining [facility]’s FollowMyHealth portal, you will also be able to view your health information using other applications (apps) compatible with our system.

## 2025-02-16 NOTE — DISCHARGE NOTE PROVIDER - NSDCFUADDAPPT_GEN_ALL_CORE_FT
Please follow up with Dr. Peng Chavez MD who performed your Kyphoplasty procedure. Phone: [phone].)

## 2025-02-16 NOTE — DISCHARGE NOTE PROVIDER - NSDCMRMEDTOKEN_GEN_ALL_CORE_FT
acetaminophen 325 mg oral tablet: 2 tab(s) orally every 6 hours  aspirin 81 mg oral tablet: 1 tab(s) orally once a day  cyclobenzaprine 5 mg oral tablet: 1 tab(s) orally 3 times a day as needed for  pain  DULoxetine 30 mg oral delayed release capsule: 1 cap(s) orally once a day  gabapentin 300 mg oral tablet: 1 tab(s) orally once a day as needed for  pain  lidocaine 4% topical film: Apply topically to affected area once a day  omeprazole 40 mg oral delayed release capsule: 1 cap(s) orally once a day  rosuvastatin 20 mg oral tablet: 1 tab(s) orally once a day (at bedtime)  Toprol-XL 25 mg oral tablet, extended release: 1 tab(s) orally once a day  zolpidem 5 mg oral tablet: 0.5 tab(s) orally once a day (at bedtime) as needed for  insomnia

## 2025-02-16 NOTE — PROGRESS NOTE ADULT - TIME BILLING
Evaluation of patient, review of chart, d/w RN
Evaluation of patient, review of charts, d/w RN
Bedside exam and interview   Reviewed vitals, labs   Discussed patient's plan of care with housestaff   Documentation of encounter

## 2025-02-16 NOTE — DISCHARGE NOTE PROVIDER - NSDCCPCAREPLAN_GEN_ALL_CORE_FT
PRINCIPAL DISCHARGE DIAGNOSIS  Diagnosis: Postoperative back pain  Assessment and Plan of Treatment: After your kyphoplasty procedure, it's normal to experience some pain or discomfort. This happens because your body is healing. Here’s what you might feel:  -Soreness: You might notice soreness where the procedure was done. This is typical and should slowly get better over time.  - Muscle Ache: The muscles near your spine may feel stiff or achy as they adjust to the changes from the procedure.  - Healing Process: Your body might have some swelling as it heals, which can cause some discomfort.  Remember, it’s important to follow any pain management instructions given to you. If your pain feels very strong or gets worse, please reach out to us or your doctor.  - Please complete your Medrol dose pack  -Take Tylenol 650 mg every 6 hrs as needed for back pain.   -You may continue to take the muscle relaxant Flexeril 5mg tablet every 8 hrs as needed for back pain for one more week, and then please discontinue to take it.   -Please follow up with Dr. Chavez regarding your kyphoplasty procedure and your recent hospitlization

## 2025-02-16 NOTE — PROGRESS NOTE ADULT - NUTRITIONAL ASSESSMENT
This patient has been assessed with a concern for Malnutrition and has been determined to have a diagnosis/diagnoses of Mild protein-calorie malnutrition.    This patient is being managed with:   Diet Regular-  Entered: Feb 13 2025  8:59PM

## 2025-02-21 DIAGNOSIS — R10.32 LEFT LOWER QUADRANT PAIN: ICD-10-CM

## 2025-02-21 DIAGNOSIS — Z86.73 PERSONAL HISTORY OF TRANSIENT ISCHEMIC ATTACK (TIA), AND CEREBRAL INFARCTION WITHOUT RESIDUAL DEFICITS: ICD-10-CM

## 2025-02-21 DIAGNOSIS — S32.040A WEDGE COMPRESSION FRACTURE OF FOURTH LUMBAR VERTEBRA, INITIAL ENCOUNTER FOR CLOSED FRACTURE: ICD-10-CM
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
## 2025-02-21 DIAGNOSIS — K57.90 DIVERTICULOSIS OF INTESTINE, PART UNSPECIFIED, WITHOUT PERFORATION OR ABSCESS WITHOUT BLEEDING: ICD-10-CM

## 2025-02-21 DIAGNOSIS — M47.9 SPONDYLOSIS, UNSPECIFIED: ICD-10-CM

## 2025-02-21 DIAGNOSIS — G89.18 OTHER ACUTE POSTPROCEDURAL PAIN: ICD-10-CM

## 2025-02-21 DIAGNOSIS — N94.89 OTHER SPECIFIED CONDITIONS ASSOCIATED WITH FEMALE GENITAL ORGANS AND MENSTRUAL CYCLE: ICD-10-CM

## 2025-02-21 DIAGNOSIS — D72.829 ELEVATED WHITE BLOOD CELL COUNT, UNSPECIFIED: ICD-10-CM

## 2025-02-21 DIAGNOSIS — Z88.5 ALLERGY STATUS TO NARCOTIC AGENT: ICD-10-CM

## 2025-02-21 DIAGNOSIS — Z87.891 PERSONAL HISTORY OF NICOTINE DEPENDENCE: ICD-10-CM
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
## 2025-02-21 DIAGNOSIS — I10 ESSENTIAL (PRIMARY) HYPERTENSION: ICD-10-CM

## 2025-02-21 DIAGNOSIS — Z96.653 PRESENCE OF ARTIFICIAL KNEE JOINT, BILATERAL: ICD-10-CM

## 2025-02-21 DIAGNOSIS — M48.061 SPINAL STENOSIS, LUMBAR REGION WITHOUT NEUROGENIC CLAUDICATION: ICD-10-CM

## 2025-02-21 DIAGNOSIS — Z98.890 OTHER SPECIFIED POSTPROCEDURAL STATES: ICD-10-CM

## 2025-02-21 DIAGNOSIS — Z79.82 LONG TERM (CURRENT) USE OF ASPIRIN: ICD-10-CM

## 2025-02-21 DIAGNOSIS — K59.00 CONSTIPATION, UNSPECIFIED: ICD-10-CM

## 2025-02-21 DIAGNOSIS — R26.2 DIFFICULTY IN WALKING, NOT ELSEWHERE CLASSIFIED: ICD-10-CM

## 2025-02-21 DIAGNOSIS — M54.50 LOW BACK PAIN, UNSPECIFIED: ICD-10-CM
